# Patient Record
Sex: MALE | Race: WHITE | NOT HISPANIC OR LATINO | Employment: UNEMPLOYED | URBAN - METROPOLITAN AREA
[De-identification: names, ages, dates, MRNs, and addresses within clinical notes are randomized per-mention and may not be internally consistent; named-entity substitution may affect disease eponyms.]

---

## 2022-09-23 PROCEDURE — 96374 THER/PROPH/DIAG INJ IV PUSH: CPT

## 2022-09-23 PROCEDURE — 99285 EMERGENCY DEPT VISIT HI MDM: CPT | Mod: 25

## 2022-09-24 ENCOUNTER — HOSPITAL ENCOUNTER (EMERGENCY)
Facility: HOSPITAL | Age: 52
Discharge: HOME | End: 2022-09-24
Attending: STUDENT IN AN ORGANIZED HEALTH CARE EDUCATION/TRAINING PROGRAM
Payer: MEDICAID

## 2022-09-24 VITALS
WEIGHT: 228.63 LBS | DIASTOLIC BLOOD PRESSURE: 81 MMHG | SYSTOLIC BLOOD PRESSURE: 139 MMHG | TEMPERATURE: 98 F | RESPIRATION RATE: 18 BRPM | HEART RATE: 69 BPM | BODY MASS INDEX: 32.01 KG/M2 | HEIGHT: 71 IN | OXYGEN SATURATION: 99 %

## 2022-09-24 DIAGNOSIS — K42.9 UMBILICAL HERNIA WITHOUT OBSTRUCTION AND WITHOUT GANGRENE: Primary | ICD-10-CM

## 2022-09-24 LAB
ALBUMIN SERPL-MCNC: 4.2 GM/DL (ref 3.5–5)
ALBUMIN/GLOB SERPL: 1.3 RATIO (ref 1.1–2)
ALP SERPL-CCNC: 72 UNIT/L (ref 40–150)
ALT SERPL-CCNC: 28 UNIT/L (ref 0–55)
AST SERPL-CCNC: 33 UNIT/L (ref 5–34)
BASOPHILS # BLD AUTO: 0.05 X10(3)/MCL (ref 0–0.2)
BASOPHILS NFR BLD AUTO: 0.5 %
BILIRUBIN DIRECT+TOT PNL SERPL-MCNC: 0.7 MG/DL
BUN SERPL-MCNC: 16.9 MG/DL (ref 8.4–25.7)
CALCIUM SERPL-MCNC: 9.7 MG/DL (ref 8.4–10.2)
CHLORIDE SERPL-SCNC: 101 MMOL/L (ref 98–107)
CO2 SERPL-SCNC: 29 MMOL/L (ref 22–29)
CREAT SERPL-MCNC: 1.02 MG/DL (ref 0.73–1.18)
CRP SERPL-MCNC: 1.5 MG/L
EOSINOPHIL # BLD AUTO: 0.09 X10(3)/MCL (ref 0–0.9)
EOSINOPHIL NFR BLD AUTO: 0.9 %
ERYTHROCYTE [DISTWIDTH] IN BLOOD BY AUTOMATED COUNT: 12.1 % (ref 11.5–17)
GFR SERPLBLD CREATININE-BSD FMLA CKD-EPI: >60 MLS/MIN/1.73/M2
GLOBULIN SER-MCNC: 3.2 GM/DL (ref 2.4–3.5)
GLUCOSE SERPL-MCNC: 127 MG/DL (ref 74–100)
HCT VFR BLD AUTO: 42.4 % (ref 42–52)
HGB BLD-MCNC: 14.6 GM/DL (ref 14–18)
IMM GRANULOCYTES # BLD AUTO: 0.02 X10(3)/MCL (ref 0–0.04)
IMM GRANULOCYTES NFR BLD AUTO: 0.2 %
LYMPHOCYTES # BLD AUTO: 3.33 X10(3)/MCL (ref 0.6–4.6)
LYMPHOCYTES NFR BLD AUTO: 32.9 %
MCH RBC QN AUTO: 30.9 PG (ref 27–31)
MCHC RBC AUTO-ENTMCNC: 34.4 MG/DL (ref 33–36)
MCV RBC AUTO: 89.6 FL (ref 80–94)
MONOCYTES # BLD AUTO: 0.93 X10(3)/MCL (ref 0.1–1.3)
MONOCYTES NFR BLD AUTO: 9.2 %
NEUTROPHILS # BLD AUTO: 5.7 X10(3)/MCL (ref 2.1–9.2)
NEUTROPHILS NFR BLD AUTO: 56.3 %
NRBC BLD AUTO-RTO: 0 %
PLATELET # BLD AUTO: 236 X10(3)/MCL (ref 130–400)
PMV BLD AUTO: 10.5 FL (ref 7.4–10.4)
POTASSIUM SERPL-SCNC: 3.6 MMOL/L (ref 3.5–5.1)
PROT SERPL-MCNC: 7.4 GM/DL (ref 6.4–8.3)
RBC # BLD AUTO: 4.73 X10(6)/MCL (ref 4.7–6.1)
SODIUM SERPL-SCNC: 140 MMOL/L (ref 136–145)
WBC # SPEC AUTO: 10.1 X10(3)/MCL (ref 4.5–11.5)

## 2022-09-24 PROCEDURE — 36415 COLL VENOUS BLD VENIPUNCTURE: CPT | Performed by: PHYSICIAN ASSISTANT

## 2022-09-24 PROCEDURE — 86140 C-REACTIVE PROTEIN: CPT | Performed by: PHYSICIAN ASSISTANT

## 2022-09-24 PROCEDURE — 80053 COMPREHEN METABOLIC PANEL: CPT | Performed by: PHYSICIAN ASSISTANT

## 2022-09-24 PROCEDURE — 85025 COMPLETE CBC W/AUTO DIFF WBC: CPT | Performed by: PHYSICIAN ASSISTANT

## 2022-09-24 PROCEDURE — 63600175 PHARM REV CODE 636 W HCPCS: Performed by: PHYSICIAN ASSISTANT

## 2022-09-24 PROCEDURE — 25500020 PHARM REV CODE 255

## 2022-09-24 RX ORDER — MORPHINE SULFATE 2 MG/ML
4 INJECTION, SOLUTION INTRAMUSCULAR; INTRAVENOUS ONCE
Status: COMPLETED | OUTPATIENT
Start: 2022-09-24 | End: 2022-09-24

## 2022-09-24 RX ADMIN — IOPAMIDOL 100 ML: 755 INJECTION, SOLUTION INTRAVENOUS at 01:09

## 2022-09-24 RX ADMIN — MORPHINE SULFATE 4 MG: 2 INJECTION, SOLUTION INTRAMUSCULAR; INTRAVENOUS at 01:09

## 2022-09-24 NOTE — DISCHARGE INSTRUCTIONS
Surgery referral sent.  They will call you to schedule an appointment.  Return to the ED if hernia becomes hard an you are unable to reduce it.  Follow-up with the primary care provider within a week.

## 2022-09-24 NOTE — ED PROVIDER NOTES
Encounter Date: 9/23/2022       History     Chief Complaint   Patient presents with    Hernia     Pt reports feeling a bulge pop out above his belly button after getting off his riding  today, 10/10 pain,burning. Vss.      Patient is a 52-year-old male who reports to the ED with central abdominal pain that began today.  He reports feeling a bulge pop out above his belly button after getting off his riding lawnmower earlier today.  He rates pain 10/10.  Patient states that shortly after popped out, he lied down and put ice on it and is able to push bulge back in.  He did endorse a decreased pain once it was back in however it popped out again.  He denies fever, chills, nausea, diarrhea, constipation.    The history is provided by the patient. No  was used.   Abdominal Pain  The current episode started today. The onset of the illness was abrupt. The abdominal pain is located in the periumbilical region. The abdominal pain radiates to the periumbilical region. The severity of the abdominal pain is 10/10. The other symptoms of the illness do not include fever, shortness of breath, nausea or vomiting.   Symptoms associated with the illness do not include chills, constipation, hematuria or back pain.   Review of patient's allergies indicates:  No Known Allergies  History reviewed. No pertinent past medical history.  History reviewed. No pertinent surgical history.  History reviewed. No pertinent family history.  Social History     Tobacco Use    Smoking status: Never    Smokeless tobacco: Never   Substance Use Topics    Drug use: Never     Review of Systems   Constitutional:  Negative for chills and fever.   Respiratory:  Negative for cough and shortness of breath.    Cardiovascular:  Negative for chest pain and palpitations.   Gastrointestinal:  Positive for abdominal pain (Above navel). Negative for constipation, nausea and vomiting.   Genitourinary:  Negative for flank pain and hematuria.    Musculoskeletal:  Negative for back pain.   Skin: Negative.    Neurological: Negative.    Hematological: Negative.      Physical Exam     Initial Vitals [09/23/22 2243]   BP Pulse Resp Temp SpO2   (!) 153/73 79 19 97 °F (36.1 °C) 98 %      MAP       --         Physical Exam    Nursing note and vitals reviewed.  Constitutional: He appears well-developed and well-nourished.   HENT:   Nose: Nose normal.   Mouth/Throat: Oropharynx is clear and moist.   Eyes: Conjunctivae are normal.   Cardiovascular:  Normal rate, normal heart sounds and intact distal pulses.           Pulmonary/Chest: Breath sounds normal.   Abdominal: Abdomen is soft. Bowel sounds are normal.   Musculoskeletal:         General: Normal range of motion.     Neurological: He is alert and oriented to person, place, and time.   Skin: Skin is warm.       ED Course   Procedures  Labs Reviewed   COMPREHENSIVE METABOLIC PANEL - Abnormal; Notable for the following components:       Result Value    Glucose Level 127 (*)     All other components within normal limits   CBC WITH DIFFERENTIAL - Abnormal; Notable for the following components:    MPV 10.5 (*)     All other components within normal limits   C-REACTIVE PROTEIN - Normal   CBC W/ AUTO DIFFERENTIAL    Narrative:     The following orders were created for panel order CBC Auto Differential.  Procedure                               Abnormality         Status                     ---------                               -----------         ------                     CBC with Differential[[id number]]        Abnormal            Final result                 Please view results for these tests on the individual orders.          Imaging Results              CT Abdomen Pelvis With Contrast (In process)                      Medications   morphine injection 4 mg (4 mg Intravenous Given 9/24/22 0127)   iopamidoL (ISOVUE-370) 76 % injection (100 mLs Intravenous Given 9/24/22 0145)     Medical Decision Making:   Clinical  Tests:   Lab Tests: Ordered and Reviewed  Radiological Study: Ordered and Reviewed  ED Management:  The patient is resting comfortably and in no acute distress.  He states that his symptoms have improved after treatment in Emergency Department. I personally discussed his test results and treatment plan.  Gave strict ED precautions and specific conditions for return to the emergency department and importance of follow up with pcp.  Patient voices understanding and agrees to the plan discussed. All patients' questions have been answered at this time. He has remained hemodynamically stable throughout entire stay in ED and is stable for discharge home.            ED Course as of 09/24/22 0221   Sat Sep 24, 2022   0158 Patient states the morphine helped with his pain [ER]   0159 I transitioned the patient to Dr. Bains at this time.   [ER]      ED Course User Index  [ER] BECCA Garcia                 Clinical Impression:   Final diagnoses:  [K42.9] Umbilical hernia without obstruction and without gangrene (Primary)             BECCA Garcia  09/24/22 0221

## 2022-10-11 ENCOUNTER — OFFICE VISIT (OUTPATIENT)
Dept: SURGERY | Facility: CLINIC | Age: 52
End: 2022-10-11
Payer: MEDICAID

## 2022-10-11 VITALS
HEIGHT: 71 IN | TEMPERATURE: 98 F | DIASTOLIC BLOOD PRESSURE: 87 MMHG | BODY MASS INDEX: 32.59 KG/M2 | SYSTOLIC BLOOD PRESSURE: 138 MMHG | HEART RATE: 71 BPM | RESPIRATION RATE: 18 BRPM | WEIGHT: 232.81 LBS | OXYGEN SATURATION: 98 %

## 2022-10-11 DIAGNOSIS — K42.9 UMBILICAL HERNIA WITHOUT OBSTRUCTION AND WITHOUT GANGRENE: ICD-10-CM

## 2022-10-11 PROCEDURE — 99215 OFFICE O/P EST HI 40 MIN: CPT | Mod: PBBFAC

## 2022-10-11 RX ORDER — HEPARIN SODIUM 5000 [USP'U]/ML
5000 INJECTION, SOLUTION INTRAVENOUS; SUBCUTANEOUS ONCE
Status: CANCELLED | OUTPATIENT
Start: 2022-10-11

## 2022-10-11 RX ORDER — AMLODIPINE AND BENAZEPRIL HYDROCHLORIDE 5; 10 MG/1; MG/1
1 CAPSULE ORAL DAILY
COMMUNITY
Start: 2022-09-28 | End: 2023-04-21 | Stop reason: SDUPTHER

## 2022-10-11 RX ORDER — DESVENLAFAXINE SUCCINATE 50 MG/1
50 TABLET, EXTENDED RELEASE ORAL DAILY
COMMUNITY
Start: 2022-09-30 | End: 2023-10-27 | Stop reason: SDUPTHER

## 2022-10-11 RX ORDER — TAMSULOSIN HYDROCHLORIDE 0.4 MG/1
1 CAPSULE ORAL NIGHTLY
COMMUNITY
Start: 2022-09-28 | End: 2023-04-21

## 2022-10-11 RX ORDER — SODIUM CHLORIDE 9 MG/ML
INJECTION, SOLUTION INTRAVENOUS CONTINUOUS
Status: CANCELLED | OUTPATIENT
Start: 2022-10-11

## 2022-10-11 NOTE — H&P (VIEW-ONLY)
"Surgery Clinic Note     CC: Reducible umbilical hernia that appeared on 9/29/2022    HPI:  Humberto Cotto Jr., 52 y.o. -year-old male with PMHx of HTN, presents to clinic for evaluation of reducible umbilical hernia that appeared on 9/29/2022. Patient reports a dull constant pain superior of umbilicus that is a 6/10 when hernia is reduced and 10/10 when herniated. Patient reports that herniation occurs when he is working in the garage along with lifting heavy objects. Hernia is reducible with ice packs. Patient reports normal BM and urination. Patient does not smoke and has BMI of 32.47 kg/m^2. Denies f/c/n/v/CP/SOB.    PMH:   Past Medical History:   Diagnosis Date    Hypertension         PSH:   Past Surgical History:   Procedure Laterality Date    TONSILLECTOMY          Fam Hx:   Family History   Problem Relation Age of Onset    Hypertension Mother         Social Hx:   Social History     Tobacco Use    Smoking status: Never    Smokeless tobacco: Never   Substance Use Topics    Alcohol use: Not Currently     Comment: ocassionally    Drug use: Never        Allergies:   Review of patient's allergies indicates:   Allergen Reactions    Penicillin g sodium Hives         ROS: Negative except above     Current Outpatient Medications on File Prior to Visit   Medication Sig Dispense Refill    amlodipine-benazepril 5-10 mg (LOTREL) 5-10 mg per capsule Take 1 capsule by mouth once daily.      desvenlafaxine succinate (PRISTIQ) 50 MG Tb24 Take 50 mg by mouth once daily.      tamsulosin (FLOMAX) 0.4 mg Cap Take 1 capsule by mouth every evening.       No current facility-administered medications on file prior to visit.       Physical Exam  /87 (BP Location: Right arm, Patient Position: Sitting)   Pulse 71   Temp 97.7 °F (36.5 °C) (Oral)   Resp 18   Ht 5' 11" (1.803 m)   Wt 105.6 kg (232 lb 12.8 oz)   SpO2 98%   BMI 32.47 kg/m²   General: NAD, AAO X 3  CV: Regular rate and rhythm without murmurs, normal S1 and S2 " sounds  Resp: Clear to ascultation bilaterally  Abdomen: soft, non-distended, bowel sounds present. Small umbilical hernia, reducible. Mild TTP with reduction of hernia contents    Imaging:  EXAMINATION:  CT ABDOMEN PELVIS WITH CONTRAST     Technique: CT of the abdomen and pelvis was performed with axial images as well as sagittal and coronal reconstruction images with intravenous contrast.     Comparison: None available.     Clinical History: Central abdominal pain, possible hernia.     Dosage Information: Automated Exposure Control was utilized 708.57 mGy.cm.     Findings:     Lines and Tubes: None.     Thorax:     Lungs: The visualized lung bases appear unremarkable.     Pleura: No effusions or thickening.     Heart: The heart size is within normal limits.     Abdomen:     Abdominal Wall: There is a small umbilical hernia which contains mesenteric fat with subtle associated mesenteric fat stranding which could reflect an element of vascular compromise to the containing fat.     Liver: The liver appears unremarkable.     Biliary System: No intrahepatic or extrahepatic biliary duct dilatation is seen.     Gallbladder: The gallbladder appears unremarkable with no stones wall thickening or pericholecystic inflammatory changes or fluid.     Pancreas: The pancreas appears unremarkable.     Spleen: The spleen appears unremarkable.     Adrenals: The adrenal glands appear unremarkable.     Kidneys: The kidneys appear unremarkable with no stones cysts masses or hydronephrosis.     Aorta: The abdominal aorta appears unremarkable.     IVC: Unremarkable.     Bowel:     Esophagus: The visualized esophagus appears unremarkable.     Stomach: The stomach appears unremarkable.     Duodenum: Unremarkable appearing duodenum.     Small Bowel: The small bowel appears unremarkable.     Colon: Nondistended.     Appendix: The appendix appears unremarkable and is seen on Image 57, Series 6.     Peritoneum: No intraperitoneal free air or  ascites is seen.     Pelvis:     Bladder: The bladder appears unremarkable.     Male:     Prostate gland: The prostate gland appears unremarkable.     Inguinal Findings:     Inguinal Hernia: Incidental note is made of small uncomplicated mesenteric fat containing bilateral inguinal hernias.     Bony structures:     Dorsal Spine: There is mild spondylosis of the visualized dorsal spine.     Bony Pelvis: The visualized bony structures of the pelvis appear unremarkable.     Impression:  Impression:     1. There is a small umbilical hernia which contains mesenteric fat with subtle associated mesenteric fat stranding which could reflect an element of vascular compromise to the containing fat. Correlate with clinical and laboratory findings as regard to additional evaluation and follow up.     I concur with the preliminary report        Electronically signed by: Misha Altman  Date:                                            09/24/2022  Time:                                           08:11           Exam Ended: 09/24/22 02:04 Last Resulted: 09/24/22 08:11                     ASSESSMENT/PLAN  Humberto Kirti Salazar, 52 y.o. -year-old male with PMHx of HTN, presents to clinic for evaluation of reducible umbilical hernia that appeared on 9/29/2022.    -Scheduled for surgery on 10/28/2022 with Dr. Reyes  -consent form filled out with patient education concerning risks and benefits of surgery      Flako Paredes  [employer] MS[facility]    Agree with student note    - Plan for open umbilical hernia repair 10/28/22  - Consents signed in clinic    aMla Spears MD  [employer] Surgery PGY3

## 2022-10-11 NOTE — PROGRESS NOTES
Patient seen by Dr. Spears. Surgery consent signed and completed. Surgery date of 10/28/22 with RTC 2 weeks postop. Instructions and buck wash cloths given to patient. Instructed patient that [facility] will call for preop appointment. Discharge instructions given verbal and written.

## 2022-10-11 NOTE — PROGRESS NOTES
"Surgery Clinic Note     CC: Reducible umbilical hernia that appeared on 9/29/2022    HPI:  Humberto Cotto Jr., 52 y.o. -year-old male with PMHx of HTN, presents to clinic for evaluation of reducible umbilical hernia that appeared on 9/29/2022. Patient reports a dull constant pain superior of umbilicus that is a 6/10 when hernia is reduced and 10/10 when herniated. Patient reports that herniation occurs when he is working in the garage along with lifting heavy objects. Hernia is reducible with ice packs. Patient reports normal BM and urination. Patient does not smoke and has BMI of 32.47 kg/m^2. Denies f/c/n/v/CP/SOB.    PMH:   Past Medical History:   Diagnosis Date    Hypertension         PSH:   Past Surgical History:   Procedure Laterality Date    TONSILLECTOMY          Fam Hx:   Family History   Problem Relation Age of Onset    Hypertension Mother         Social Hx:   Social History     Tobacco Use    Smoking status: Never    Smokeless tobacco: Never   Substance Use Topics    Alcohol use: Not Currently     Comment: ocassionally    Drug use: Never        Allergies:   Review of patient's allergies indicates:   Allergen Reactions    Penicillin g sodium Hives         ROS: Negative except above     Current Outpatient Medications on File Prior to Visit   Medication Sig Dispense Refill    amlodipine-benazepril 5-10 mg (LOTREL) 5-10 mg per capsule Take 1 capsule by mouth once daily.      desvenlafaxine succinate (PRISTIQ) 50 MG Tb24 Take 50 mg by mouth once daily.      tamsulosin (FLOMAX) 0.4 mg Cap Take 1 capsule by mouth every evening.       No current facility-administered medications on file prior to visit.       Physical Exam  /87 (BP Location: Right arm, Patient Position: Sitting)   Pulse 71   Temp 97.7 °F (36.5 °C) (Oral)   Resp 18   Ht 5' 11" (1.803 m)   Wt 105.6 kg (232 lb 12.8 oz)   SpO2 98%   BMI 32.47 kg/m²   General: NAD, AAO X 3  CV: Regular rate and rhythm without murmurs, normal S1 and S2 " sounds  Resp: Clear to ascultation bilaterally  Abdomen: soft, non-distended, bowel sounds present. Small umbilical hernia, reducible. Mild TTP with reduction of hernia contents    Imaging:  EXAMINATION:  CT ABDOMEN PELVIS WITH CONTRAST     Technique: CT of the abdomen and pelvis was performed with axial images as well as sagittal and coronal reconstruction images with intravenous contrast.     Comparison: None available.     Clinical History: Central abdominal pain, possible hernia.     Dosage Information: Automated Exposure Control was utilized 708.57 mGy.cm.     Findings:     Lines and Tubes: None.     Thorax:     Lungs: The visualized lung bases appear unremarkable.     Pleura: No effusions or thickening.     Heart: The heart size is within normal limits.     Abdomen:     Abdominal Wall: There is a small umbilical hernia which contains mesenteric fat with subtle associated mesenteric fat stranding which could reflect an element of vascular compromise to the containing fat.     Liver: The liver appears unremarkable.     Biliary System: No intrahepatic or extrahepatic biliary duct dilatation is seen.     Gallbladder: The gallbladder appears unremarkable with no stones wall thickening or pericholecystic inflammatory changes or fluid.     Pancreas: The pancreas appears unremarkable.     Spleen: The spleen appears unremarkable.     Adrenals: The adrenal glands appear unremarkable.     Kidneys: The kidneys appear unremarkable with no stones cysts masses or hydronephrosis.     Aorta: The abdominal aorta appears unremarkable.     IVC: Unremarkable.     Bowel:     Esophagus: The visualized esophagus appears unremarkable.     Stomach: The stomach appears unremarkable.     Duodenum: Unremarkable appearing duodenum.     Small Bowel: The small bowel appears unremarkable.     Colon: Nondistended.     Appendix: The appendix appears unremarkable and is seen on Image 57, Series 6.     Peritoneum: No intraperitoneal free air or  ascites is seen.     Pelvis:     Bladder: The bladder appears unremarkable.     Male:     Prostate gland: The prostate gland appears unremarkable.     Inguinal Findings:     Inguinal Hernia: Incidental note is made of small uncomplicated mesenteric fat containing bilateral inguinal hernias.     Bony structures:     Dorsal Spine: There is mild spondylosis of the visualized dorsal spine.     Bony Pelvis: The visualized bony structures of the pelvis appear unremarkable.     Impression:  Impression:     1. There is a small umbilical hernia which contains mesenteric fat with subtle associated mesenteric fat stranding which could reflect an element of vascular compromise to the containing fat. Correlate with clinical and laboratory findings as regard to additional evaluation and follow up.     I concur with the preliminary report        Electronically signed by: Misha Altman  Date:                                            09/24/2022  Time:                                           08:11           Exam Ended: 09/24/22 02:04 Last Resulted: 09/24/22 08:11                     ASSESSMENT/PLAN  Humberto Kirti Salazar, 52 y.o. -year-old male with PMHx of HTN, presents to clinic for evaluation of reducible umbilical hernia that appeared on 9/29/2022.    -Scheduled for surgery on 10/28/2022 with Dr. Reyes  -consent form filled out with patient education concerning risks and benefits of surgery      Flako Paredes  [employer] [facility]    Agree with student note    - Plan for open umbilical hernia repair 10/28/22  - Consents signed in clinic    Mala Spears MD  [employer] Surgery PGY3

## 2022-10-18 ENCOUNTER — ANESTHESIA EVENT (OUTPATIENT)
Dept: SURGERY | Facility: HOSPITAL | Age: 52
End: 2022-10-18
Payer: MEDICAID

## 2022-10-18 NOTE — ANESTHESIA PREPROCEDURE EVALUATION
10/18/2022  Humberto Cotto Jr. is a 52 y.o., male.    COVID STATUS: TEST DOS  BETA-BLOCKER: NONE    PAT NURSE & NP PHONE INTERVIEW 10/20/22    PROBLEM LIST:  -  UMBILICAL HERNIA  -  OBESITY CLASS I  -  HTN  -  REPORTS on PRISTIQ for SWEATY, ODOROUS FEET; DENIES DEPRESSION   -  URINARY RETENTION, NEVER Dx w/BPH  -  ETOH 1 BEER 2x/WEEK    AM Rx DOS: PRISTIQ, FLOMAX    ORDERS -   SURGEON: 9/24/22 CBC, CMP; NO NEW ORDERS  ANESTHESIA: NONE    Pre-op Assessment    I have reviewed the Patient Summary Reports.     I have reviewed the Nursing Notes. I have reviewed the NPO Status.   I have reviewed the Medications.     Review of Systems  Anesthesia Hx:  No problems with previous Anesthesia  History of prior surgery of interest to airway management or planning: Denies Family Hx of Anesthesia complications.   Denies Personal Hx of Anesthesia complications.   Hematology/Oncology:  Hematology Normal   Oncology Normal     EENT/Dental:EENT/Dental Normal   Cardiovascular:   Exercise tolerance: good Hypertension    Pulmonary:  Pulmonary Normal    Renal/:  Renal/ Normal     Hepatic/GI:  Hepatic/GI Normal    Musculoskeletal:  Musculoskeletal Normal    Neurological:  Neurology Normal    Endocrine:  Endocrine Normal    Dermatological:  Skin Normal    Psych:  Psychiatric Normal           Physical Exam  General: Well nourished, Cooperative, Alert and Oriented    Airway:  Mallampati: I / I  Mouth Opening: Normal  TM Distance: Normal  Tongue: Large, Normal  Neck ROM: Normal ROM    Dental:  Intact        Anesthesia Plan  Type of Anesthesia, risks & benefits discussed:    Anesthesia Type: Gen ETT  Intra-op Monitoring Plan: Standard ASA Monitors  Post Op Pain Control Plan: multimodal analgesia and IV/PO Opioids PRN  Induction:  IV  Airway Plan: Direct  Informed Consent: Informed consent signed with the Patient and all parties  understand the risks and agree with anesthesia plan.  All questions answered. Patient consented to blood products? No  ASA Score: 2  Day of Surgery Review of History & Physical: H&P Update referred to the surgeon/provider.    Ready For Surgery From Anesthesia Perspective.     .    Lab Results   Component Value Date    WBC 10.1 09/24/2022    HGB 14.6 09/24/2022    HCT 42.4 09/24/2022    MCV 89.6 09/24/2022     09/24/2022     CMP  Sodium Level   Date Value Ref Range Status   09/24/2022 140 136 - 145 mmol/L Final     Potassium Level   Date Value Ref Range Status   09/24/2022 3.6 3.5 - 5.1 mmol/L Final     Carbon Dioxide   Date Value Ref Range Status   09/24/2022 29 22 - 29 mmol/L Final     Blood Urea Nitrogen   Date Value Ref Range Status   09/24/2022 16.9 8.4 - 25.7 mg/dL Final     Creatinine   Date Value Ref Range Status   09/24/2022 1.02 0.73 - 1.18 mg/dL Final     Calcium Level Total   Date Value Ref Range Status   09/24/2022 9.7 8.4 - 10.2 mg/dL Final     Albumin Level   Date Value Ref Range Status   09/24/2022 4.2 3.5 - 5.0 gm/dL Final     Bilirubin Total   Date Value Ref Range Status   09/24/2022 0.7 <=1.5 mg/dL Final     Alkaline Phosphatase   Date Value Ref Range Status   09/24/2022 72 40 - 150 unit/L Final     Aspartate Aminotransferase   Date Value Ref Range Status   09/24/2022 33 5 - 34 unit/L Final     Alanine Aminotransferase   Date Value Ref Range Status   09/24/2022 28 0 - 55 unit/L Final

## 2022-10-28 ENCOUNTER — ANESTHESIA (OUTPATIENT)
Dept: SURGERY | Facility: HOSPITAL | Age: 52
End: 2022-10-28
Payer: MEDICAID

## 2022-10-28 ENCOUNTER — HOSPITAL ENCOUNTER (OUTPATIENT)
Facility: HOSPITAL | Age: 52
Discharge: HOME | End: 2022-10-28
Attending: SURGERY | Admitting: SURGERY
Payer: MEDICAID

## 2022-10-28 DIAGNOSIS — K42.9 UMBILICAL HERNIA WITHOUT OBSTRUCTION AND WITHOUT GANGRENE: Primary | ICD-10-CM

## 2022-10-28 PROCEDURE — 71000016 HC POSTOP RECOV ADDL HR: Performed by: SURGERY

## 2022-10-28 PROCEDURE — 25000003 PHARM REV CODE 250: Performed by: STUDENT IN AN ORGANIZED HEALTH CARE EDUCATION/TRAINING PROGRAM

## 2022-10-28 PROCEDURE — 37000008 HC ANESTHESIA 1ST 15 MINUTES: Performed by: SURGERY

## 2022-10-28 PROCEDURE — 00830 ANES HERNIA RPR LWR ABD NOS: CPT | Performed by: SURGERY

## 2022-10-28 PROCEDURE — 36000706: Performed by: SURGERY

## 2022-10-28 PROCEDURE — 63600175 PHARM REV CODE 636 W HCPCS: Performed by: NURSE ANESTHETIST, CERTIFIED REGISTERED

## 2022-10-28 PROCEDURE — 63600175 PHARM REV CODE 636 W HCPCS: Performed by: SPECIALIST

## 2022-10-28 PROCEDURE — 63600175 PHARM REV CODE 636 W HCPCS: Performed by: STUDENT IN AN ORGANIZED HEALTH CARE EDUCATION/TRAINING PROGRAM

## 2022-10-28 PROCEDURE — 37000009 HC ANESTHESIA EA ADD 15 MINS: Performed by: SURGERY

## 2022-10-28 PROCEDURE — 36000707: Performed by: SURGERY

## 2022-10-28 PROCEDURE — 71000015 HC POSTOP RECOV 1ST HR: Performed by: SURGERY

## 2022-10-28 PROCEDURE — 71000033 HC RECOVERY, INTIAL HOUR: Performed by: SURGERY

## 2022-10-28 PROCEDURE — 88302 TISSUE EXAM BY PATHOLOGIST: CPT | Performed by: SURGERY

## 2022-10-28 PROCEDURE — 25000003 PHARM REV CODE 250: Performed by: NURSE ANESTHETIST, CERTIFIED REGISTERED

## 2022-10-28 PROCEDURE — 25000003 PHARM REV CODE 250: Performed by: SURGERY

## 2022-10-28 RX ORDER — ONDANSETRON 2 MG/ML
4 INJECTION INTRAMUSCULAR; INTRAVENOUS ONCE
Status: DISCONTINUED | OUTPATIENT
Start: 2022-10-28 | End: 2022-10-28 | Stop reason: HOSPADM

## 2022-10-28 RX ORDER — MIDAZOLAM HYDROCHLORIDE 1 MG/ML
INJECTION INTRAMUSCULAR; INTRAVENOUS
Status: DISCONTINUED
Start: 2022-10-28 | End: 2022-10-28 | Stop reason: HOSPADM

## 2022-10-28 RX ORDER — BUPIVACAINE HYDROCHLORIDE 2.5 MG/ML
INJECTION, SOLUTION EPIDURAL; INFILTRATION; INTRACAUDAL
Status: DISCONTINUED
Start: 2022-10-28 | End: 2022-10-28 | Stop reason: HOSPADM

## 2022-10-28 RX ORDER — VASOPRESSIN 20 [USP'U]/ML
INJECTION, SOLUTION INTRAMUSCULAR; SUBCUTANEOUS
Status: DISCONTINUED | OUTPATIENT
Start: 2022-10-28 | End: 2022-10-28

## 2022-10-28 RX ORDER — DIPHENHYDRAMINE HYDROCHLORIDE 50 MG/ML
25 INJECTION INTRAMUSCULAR; INTRAVENOUS ONCE AS NEEDED
Status: DISCONTINUED | OUTPATIENT
Start: 2022-10-28 | End: 2022-10-28 | Stop reason: HOSPADM

## 2022-10-28 RX ORDER — FENTANYL CITRATE 50 UG/ML
INJECTION, SOLUTION INTRAMUSCULAR; INTRAVENOUS
Status: DISCONTINUED | OUTPATIENT
Start: 2022-10-28 | End: 2022-10-28

## 2022-10-28 RX ORDER — LIDOCAINE HYDROCHLORIDE 20 MG/ML
INJECTION, SOLUTION EPIDURAL; INFILTRATION; INTRACAUDAL; PERINEURAL
Status: DISCONTINUED | OUTPATIENT
Start: 2022-10-28 | End: 2022-10-28

## 2022-10-28 RX ORDER — CLINDAMYCIN PHOSPHATE 900 MG/50ML
900 INJECTION, SOLUTION INTRAVENOUS
Status: COMPLETED | OUTPATIENT
Start: 2022-10-28 | End: 2022-10-28

## 2022-10-28 RX ORDER — OXYCODONE AND ACETAMINOPHEN 5; 325 MG/1; MG/1
2 TABLET ORAL ONCE
Status: DISCONTINUED | OUTPATIENT
Start: 2022-10-28 | End: 2022-10-28 | Stop reason: HOSPADM

## 2022-10-28 RX ORDER — EPHEDRINE SULFATE 50 MG/ML
INJECTION, SOLUTION INTRAVENOUS
Status: DISCONTINUED | OUTPATIENT
Start: 2022-10-28 | End: 2022-10-28

## 2022-10-28 RX ORDER — PROCHLORPERAZINE EDISYLATE 5 MG/ML
5 INJECTION INTRAMUSCULAR; INTRAVENOUS ONCE AS NEEDED
Status: DISCONTINUED | OUTPATIENT
Start: 2022-10-28 | End: 2022-10-28 | Stop reason: HOSPADM

## 2022-10-28 RX ORDER — KETOROLAC TROMETHAMINE 30 MG/ML
INJECTION, SOLUTION INTRAMUSCULAR; INTRAVENOUS
Status: DISCONTINUED | OUTPATIENT
Start: 2022-10-28 | End: 2022-10-28

## 2022-10-28 RX ORDER — HYDROCODONE BITARTRATE AND ACETAMINOPHEN 5; 325 MG/1; MG/1
1 TABLET ORAL EVERY 6 HOURS PRN
Qty: 10 TABLET | Refills: 0 | Status: SHIPPED | OUTPATIENT
Start: 2022-10-28 | End: 2023-04-21

## 2022-10-28 RX ORDER — MEPERIDINE HYDROCHLORIDE 25 MG/ML
12.5 INJECTION INTRAMUSCULAR; INTRAVENOUS; SUBCUTANEOUS ONCE
Status: DISCONTINUED | OUTPATIENT
Start: 2022-10-28 | End: 2022-10-28 | Stop reason: HOSPADM

## 2022-10-28 RX ORDER — LIDOCAINE HYDROCHLORIDE 10 MG/ML
1 INJECTION, SOLUTION EPIDURAL; INFILTRATION; INTRACAUDAL; PERINEURAL ONCE
Status: DISCONTINUED | OUTPATIENT
Start: 2022-10-28 | End: 2023-04-21

## 2022-10-28 RX ORDER — DEXAMETHASONE SODIUM PHOSPHATE 4 MG/ML
INJECTION, SOLUTION INTRA-ARTICULAR; INTRALESIONAL; INTRAMUSCULAR; INTRAVENOUS; SOFT TISSUE
Status: DISCONTINUED | OUTPATIENT
Start: 2022-10-28 | End: 2022-10-28

## 2022-10-28 RX ORDER — HYDROMORPHONE HYDROCHLORIDE 1 MG/ML
0.5 INJECTION, SOLUTION INTRAMUSCULAR; INTRAVENOUS; SUBCUTANEOUS EVERY 5 MIN PRN
Status: DISCONTINUED | OUTPATIENT
Start: 2022-10-28 | End: 2022-10-28 | Stop reason: HOSPADM

## 2022-10-28 RX ORDER — ONDANSETRON 2 MG/ML
INJECTION INTRAMUSCULAR; INTRAVENOUS
Status: DISCONTINUED | OUTPATIENT
Start: 2022-10-28 | End: 2022-10-28

## 2022-10-28 RX ORDER — IPRATROPIUM BROMIDE AND ALBUTEROL SULFATE 2.5; .5 MG/3ML; MG/3ML
3 SOLUTION RESPIRATORY (INHALATION) ONCE AS NEEDED
Status: DISCONTINUED | OUTPATIENT
Start: 2022-10-28 | End: 2022-10-28 | Stop reason: HOSPADM

## 2022-10-28 RX ORDER — MIDAZOLAM HYDROCHLORIDE 1 MG/ML
2 INJECTION INTRAMUSCULAR; INTRAVENOUS ONCE AS NEEDED
Status: COMPLETED | OUTPATIENT
Start: 2022-10-28 | End: 2022-10-28

## 2022-10-28 RX ORDER — SODIUM CHLORIDE, SODIUM LACTATE, POTASSIUM CHLORIDE, CALCIUM CHLORIDE 600; 310; 30; 20 MG/100ML; MG/100ML; MG/100ML; MG/100ML
INJECTION, SOLUTION INTRAVENOUS CONTINUOUS
Status: DISCONTINUED | OUTPATIENT
Start: 2022-10-28 | End: 2022-10-28 | Stop reason: HOSPADM

## 2022-10-28 RX ORDER — HYDROMORPHONE HYDROCHLORIDE 1 MG/ML
0.2 INJECTION, SOLUTION INTRAMUSCULAR; INTRAVENOUS; SUBCUTANEOUS EVERY 5 MIN PRN
Status: DISCONTINUED | OUTPATIENT
Start: 2022-10-28 | End: 2022-10-28 | Stop reason: HOSPADM

## 2022-10-28 RX ORDER — SODIUM CHLORIDE 9 MG/ML
INJECTION, SOLUTION INTRAVENOUS CONTINUOUS
Status: DISCONTINUED | OUTPATIENT
Start: 2022-10-28 | End: 2022-10-28 | Stop reason: HOSPADM

## 2022-10-28 RX ORDER — BUPIVACAINE HYDROCHLORIDE 2.5 MG/ML
INJECTION, SOLUTION EPIDURAL; INFILTRATION; INTRACAUDAL
Status: DISCONTINUED | OUTPATIENT
Start: 2022-10-28 | End: 2022-10-28 | Stop reason: HOSPADM

## 2022-10-28 RX ORDER — HEPARIN SODIUM 5000 [USP'U]/ML
5000 INJECTION, SOLUTION INTRAVENOUS; SUBCUTANEOUS ONCE
Status: COMPLETED | OUTPATIENT
Start: 2022-10-28 | End: 2022-10-28

## 2022-10-28 RX ORDER — PROPOFOL 10 MG/ML
VIAL (ML) INTRAVENOUS
Status: DISCONTINUED | OUTPATIENT
Start: 2022-10-28 | End: 2022-10-28

## 2022-10-28 RX ADMIN — MIDAZOLAM HYDROCHLORIDE 2 MG: 1 INJECTION, SOLUTION INTRAMUSCULAR; INTRAVENOUS at 09:10

## 2022-10-28 RX ADMIN — CLINDAMYCIN IN 5 PERCENT DEXTROSE 900 MG: 18 INJECTION, SOLUTION INTRAVENOUS at 09:10

## 2022-10-28 RX ADMIN — PROPOFOL 40 MG: 10 INJECTION, EMULSION INTRAVENOUS at 09:10

## 2022-10-28 RX ADMIN — EPHEDRINE SULFATE 15 MG: 50 INJECTION INTRAVENOUS at 09:10

## 2022-10-28 RX ADMIN — KETOROLAC TROMETHAMINE 30 MG: 30 INJECTION, SOLUTION INTRAMUSCULAR; INTRAVENOUS at 09:10

## 2022-10-28 RX ADMIN — FENTANYL CITRATE 50 MCG: 50 INJECTION, SOLUTION INTRAMUSCULAR; INTRAVENOUS at 09:10

## 2022-10-28 RX ADMIN — DEXAMETHASONE SODIUM PHOSPHATE 8 MG: 4 INJECTION, SOLUTION INTRA-ARTICULAR; INTRALESIONAL; INTRAMUSCULAR; INTRAVENOUS; SOFT TISSUE at 09:10

## 2022-10-28 RX ADMIN — VASOPRESSIN 1 UNITS: 20 INJECTION INTRAVENOUS at 10:10

## 2022-10-28 RX ADMIN — VASOPRESSIN 1 UNITS: 20 INJECTION INTRAVENOUS at 09:10

## 2022-10-28 RX ADMIN — EPHEDRINE SULFATE 10 MG: 50 INJECTION INTRAVENOUS at 09:10

## 2022-10-28 RX ADMIN — LIDOCAINE HYDROCHLORIDE 100 MG: 20 INJECTION, SOLUTION EPIDURAL; INFILTRATION; INTRACAUDAL; PERINEURAL at 09:10

## 2022-10-28 RX ADMIN — SODIUM CHLORIDE, POTASSIUM CHLORIDE, SODIUM LACTATE AND CALCIUM CHLORIDE: 600; 310; 30; 20 INJECTION, SOLUTION INTRAVENOUS at 09:10

## 2022-10-28 RX ADMIN — PROPOFOL 30 MG: 10 INJECTION, EMULSION INTRAVENOUS at 10:10

## 2022-10-28 RX ADMIN — PROPOFOL 200 MG: 10 INJECTION, EMULSION INTRAVENOUS at 09:10

## 2022-10-28 RX ADMIN — HEPARIN SODIUM 5000 UNITS: 5000 INJECTION, SOLUTION INTRAVENOUS; SUBCUTANEOUS at 07:10

## 2022-10-28 RX ADMIN — ONDANSETRON 4 MG: 2 INJECTION INTRAMUSCULAR; INTRAVENOUS at 10:10

## 2022-10-28 RX ADMIN — EPHEDRINE SULFATE 25 MG: 50 INJECTION INTRAVENOUS at 09:10

## 2022-10-28 NOTE — DISCHARGE INSTRUCTIONS
UMBILICAL HERNIA    ***** PLEASE KEEP THESE POST OP INSTRUCTIONS WITH YOU UNTIL YOUR FOLLOW-UP APPOINTMENT *****    · FOLLOW UP: Appointment in [facility] Nov 10th at 11:00am.    · PAIN: Apply ice pack to abdomen as needed for pain. Alternate Tylenol and Ibuprofen (regular over the counter- follow instructions on the bottle). Take Your prescription pain medication AS PRESCRIBED ONLY for severe pain unrelieved by Tylenol (acetaminophen) or Ibuprofen.    · EXAMPLE: Take Tylenol. Three hours later take Ibuprofen. Three hours after that take Tylenol again, and repeat until no longer needed. If Pain is still bad once you start Tylenol and Ibuprofen, add pain medication. Dont drive or drink alcohol with pain medication. Dont take pain medication more often than it is prescribed to be taken.    INFECTION: Call your doctor at [phone] or report to the emergency room:    - if redness around your incision begins to spread, or you have swelling.    - If your incision has opened up    - If you have green, yellow, or cottage cheese-like drainage coming from your incision    - If you begin to run fever over 100.4 F    - if you are feeling weaker    - INCISION (WOUND) CARE: Leave clear dressing and guaze in place for 24 hours then ok to remove. Leave adhesive glue on your skin until the glue peels away on its own. You may take a shower tomorrow. Wash gently over incision site - do not scrub or peel skin glue. Do not soak your wounds in water for 6 weeks. Do not take baths, swim, or use a hot tub until your doctor says it is okay.    · NAUSEA: You can eat and drink whatever you like as tolerated. You may experience post anesthesia nausea. Apply cold cloths to your face and neck and call your doctor for a prescription for nausea medication. If you have any uncontrolled vomiting that is not resolving within a few hours, report to your emergency room. Resume all medications tomorrow.    · ACTIVITY: Do not lift anything that  "is heavier than 10 lbs. (4.5 kg) for 6 week. Return to work when you feel well enough: your pain is controlled without the narcotic pain medication and you feel strong enough. Do not drink alcohol or drive today, or as long as you are on pain medication.    · Notify MD of any moderate to severe pain unrelieved by pain medicine or for any signs of infection including fever above 100.4, excessive redness or swelling, yellow/green foul- smelling drainage, nausea or vomiting. Clinics number is [phone]. If it is after business hours or emergency call [phone] and state Im having post op complications and need to speak to the surgeon on call.   If when doing activity and you feel a  "POP" go to the ER.    · We appreciate you checking in for us. Thanks for choosing [facility]! Have a smooth recovery!  "

## 2022-10-28 NOTE — ANESTHESIA PROCEDURE NOTES
Intubation    Date/Time: 10/28/2022 9:32 AM  Performed by: Yury Watt CRNA  Authorized by: Sydney Medina MD     Intubation:     Induction:  Intravenous    Intubated:  Postinduction    Mask Ventilation:  Easy with oral airway    Attempts:  1    Attempted By:  Student    Difficult Airway Encountered?: No      Complications:  None    Airway Device:  Supraglottic airway/LMA    Airway Device Size:  4.0    Placement Verified By:  Capnometry    Complicating Factors:  None    Findings Post-Intubation:  BS equal bilateral and atraumatic/condition of teeth unchanged

## 2022-10-28 NOTE — INTERVAL H&P NOTE
The patient has been examined and the H&P has been reviewed:    I concur with the findings and no changes have occurred since H&P was written.    Surgery risks, benefits and alternative options discussed and understood by patient/family.    To OR for open UHR without mesh      Hospital Problems:  - Umbilical hernia, reducible

## 2022-10-28 NOTE — OP NOTE
PATIENT NAME: Humberto Cotto Jr.  MRN: [id number]  ADMIT DATE: 10/28/2022  PROCEDURE DATE: 10/28/22    SURGEON: Dr. Joesph Reyes MD    RESIDENT: Mike Moreno MD    PREOPERATIVE DIAGNOSES:  Incarcerated Umbilical Hernia    POSTOPERATIVE DIAGNOSES:  Incarcerated Umbilical Hernia    PROCEDURE PERFORMED:  Umbilical Hernia Repair Primary Repair    INDICATION: This is a 53 yo M with a PMH of HTN who presents with an incarcerated umbilical hernia. CT confirmed this fascial defect to be 1cm in size and containing only fat. He presented for umbilical hernia repair.    FINDINGS: 1cm fascial defect, repaired primarily.    ANESTHESIA: General Anesthesia    DRAINS: None    SPECIMEN: Hernia Sac    ESTIMATED BLOOD LOSS: 10cc    COMPLICATIONS: None    TECHNIQUE:   Informed consented was obtained prior to the procedure. All risks, benefits, alternatives were explained in detail to the patient. The patient was wheeled into the operating theatre. Anesthesia induced the patient and performed endotracheal intubation. The patient was placed in the supine position. The abdomen was prepped and draped in the standard fashion. A formal timeout was held confirming correct patient, procedure, site, preoperative antibiotics, VTE prophylaxis, and all operating room staff.     A 4cm curvilinear incision was made just below the umbilicus. The subcutaneous tissue was dissected using electrocautery. The hernia sac was dissected off the fascial defect using a combination of blunt and sharp dissection. The fat containing hernia sac was amputated at the base using electrocautery. A finger sweep was performed confirming the edges of the fascial defect were free. The defect measured 1cm. A total of five 0 ethibond simple interrupted sutures were used to close the fascial defect primarily. The peritoneal undersurface of the umbilicus was cleaned off using metzenbaum scissors. The umbilical skin was tacked to the fascia using a 3-0 vicryl simple  interrupted stitch. The dermis was re-approximated with simple running 3-0 vicryl stitch. The skin was anesthetized with 10cc of local anesthetic. The skin was closed with 4-0 monocryl subcuticular stitch. Dermabond was used to dress the wound. The wound was dressed with gauze and tegaderm.    At the conclusion of the procedure all counts were correct x 2. The patient tolerated the procedure well, was extubated and transferred to PACU in stable condition.

## 2022-10-28 NOTE — TRANSFER OF CARE
Anesthesia Transfer of Care Note    Patient: Humberto Cotto Jr.    Procedure(s) Performed: Procedure(s) (LRB):  REPAIR, HERNIA, UMBILICAL (N/A)    Patient location: PACU    Anesthesia Type: general    Transport from OR: Transported from OR on room air with adequate spontaneous ventilation    Post pain: adequate analgesia    Post assessment: no apparent anesthetic complications and tolerated procedure well    Post vital signs: stable    Level of consciousness: sedated    Nausea/Vomiting: no nausea/vomiting    Complications: none    Transfer of care protocol was followed

## 2022-10-28 NOTE — DISCHARGE SUMMARY
[facility] Services  Discharge Note  Short Stay    Procedure(s) (LRB):  REPAIR, HERNIA, UMBILICAL (N/A)      OUTCOME: Patient tolerated treatment/procedure well without complication and is now ready for discharge.    DISPOSITION: Home or Self Care    FINAL DIAGNOSIS:  <principal problem not specified>    FOLLOWUP: In clinic 2 weeks    DISCHARGE INSTRUCTIONS:    Discharge Procedure Orders   Diet Adult Regular     Lifting restrictions   Order Comments: No more than 10 lbs for 6 weeks     Notify your health care provider if you experience any of the following:  temperature >100.4     Notify your health care provider if you experience any of the following:  severe uncontrolled pain     Notify your health care provider if you experience any of the following:  redness, tenderness, or signs of infection (pain, swelling, redness, odor or green/yellow discharge around incision site)     Notify your health care provider if you experience any of the following:  persistent nausea and vomiting or diarrhea     No dressing needed        TIME SPENT ON DISCHARGE: 15 minutes

## 2022-10-28 NOTE — ANESTHESIA POSTPROCEDURE EVALUATION
Anesthesia Post Evaluation    Patient: Humberto Cotto Jr.    Procedure(s) Performed: Procedure(s) (LRB):  REPAIR, HERNIA, UMBILICAL (N/A)    Final Anesthesia Type: general      Patient location during evaluation: PACU  Patient participation: Yes- Able to Participate  Level of consciousness: awake and responds to stimulation  Post-procedure vital signs: reviewed and stable  Pain management: adequate  Airway patency: patent    PONV status at discharge: No PONV  Anesthetic complications: no      Cardiovascular status: blood pressure returned to baseline  Respiratory status: unassisted  Hydration status: euvolemic  Follow-up not needed.          Vitals Value Taken Time   /87 10/28/22 1230   Temp 36.6 °C (97.9 °F) 10/28/22 1230   Pulse 83 10/28/22 1230   Resp 16 10/28/22 1230   SpO2 95 % 10/28/22 1230         Event Time   Out of Recovery 11:23:00         Pain/Tara Score: Tara Score: 10 (10/28/2022 11:30 AM)  Modified Tara Score: 20 (10/28/2022 12:30 PM)

## 2022-10-31 VITALS
RESPIRATION RATE: 16 BRPM | TEMPERATURE: 98 F | WEIGHT: 227.94 LBS | DIASTOLIC BLOOD PRESSURE: 87 MMHG | SYSTOLIC BLOOD PRESSURE: 130 MMHG | HEART RATE: 83 BPM | OXYGEN SATURATION: 95 % | BODY MASS INDEX: 31.79 KG/M2

## 2022-10-31 LAB
ESTROGEN SERPL-MCNC: NORMAL PG/ML
INSULIN SERPL-ACNC: NORMAL U[IU]/ML
LAB AP CLINICAL INFORMATION: NORMAL
LAB AP GROSS DESCRIPTION: NORMAL
LAB AP REPORT FOOTNOTES: NORMAL
T3RU NFR SERPL: NORMAL %

## 2022-11-10 ENCOUNTER — OFFICE VISIT (OUTPATIENT)
Dept: SURGERY | Facility: CLINIC | Age: 52
End: 2022-11-10
Payer: MEDICAID

## 2022-11-10 ENCOUNTER — TELEPHONE (OUTPATIENT)
Dept: GASTROENTEROLOGY | Facility: CLINIC | Age: 52
End: 2022-11-10
Payer: MEDICAID

## 2022-11-10 VITALS
SYSTOLIC BLOOD PRESSURE: 134 MMHG | OXYGEN SATURATION: 96 % | HEART RATE: 81 BPM | RESPIRATION RATE: 18 BRPM | TEMPERATURE: 98 F | DIASTOLIC BLOOD PRESSURE: 88 MMHG | BODY MASS INDEX: 32.13 KG/M2 | WEIGHT: 237.19 LBS | HEIGHT: 72 IN

## 2022-11-10 DIAGNOSIS — K42.9 UMBILICAL HERNIA WITHOUT OBSTRUCTION AND WITHOUT GANGRENE: ICD-10-CM

## 2022-11-10 DIAGNOSIS — Z12.11 ENCOUNTER FOR SCREENING COLONOSCOPY: Primary | ICD-10-CM

## 2022-11-10 PROCEDURE — 99214 OFFICE O/P EST MOD 30 MIN: CPT | Mod: PBBFAC

## 2022-11-10 NOTE — TELEPHONE ENCOUNTER
Hello,    I receieved a referral on this PT for a screening colonoscopy.  Due to our back log of referrals, we are currently on diversion for screening procedures.  Please order a FIT test or Cologuard (if meets criteria) on PT then refer back if Positive.      You may also refer this patient to an external GI provider for screening procedure.  Please be sure to fax referrals manually as they do not use Epic for referrals.    Please let me know if you have any questions.    For Medicaid patients refer to:  Dr. Florian or Dr. Herman Khan    For Medicare, commercial patients refer to:  [facility] or Gastro clinic    Thank you,    GI Dept. [facility]

## 2022-11-10 NOTE — PROGRESS NOTES
Pt seen by Dr. Rico & Dr. Enriquez; GI referral placed; Pt instructed to return to clinic as needed; Discharge paperwork given w/pt verbalizing understanding

## 2022-11-10 NOTE — PROGRESS NOTES
I have reviewed the notes, assessments, and/or procedures performed by the resident, I concur with her/his documentation of Humberto Cotto Jr..     Carie Cao MD

## 2022-11-10 NOTE — PROGRESS NOTES
[facility] General Surgery Clinic Note    HPI: Humberto Cotto Jr. is 52 year old male with a history of HTN who presents for a 2-week follow up after an umbilical hernia repair. He is doing well today and denies fever, nausea, vomiting, and abdominal pain. He states he would like a referral for a colonoscopy since his last one was over 10 years ago.      PMH:   Past Medical History:   Diagnosis Date    Hypertension       Meds:   Current Outpatient Medications:     amlodipine-benazepril 5-10 mg (LOTREL) 5-10 mg per capsule, Take 1 capsule by mouth once daily., Disp: , Rfl:     desvenlafaxine succinate (PRISTIQ) 50 MG Tb24, Take 50 mg by mouth once daily., Disp: , Rfl:     HYDROcodone-acetaminophen (NORCO) 5-325 mg per tablet, Take 1 tablet by mouth every 6 (six) hours as needed for Pain., Disp: 10 tablet, Rfl: 0    tamsulosin (FLOMAX) 0.4 mg Cap, Take 1 capsule by mouth every evening., Disp: , Rfl:   No current facility-administered medications for this visit.    Facility-Administered Medications Ordered in Other Visits:     LIDOcaine (PF) 10 mg/ml (1%) injection 10 mg, 1 mL, Intradermal, Once, JOE Paul  Allergies:   Review of patient's allergies indicates:   Allergen Reactions    Penicillin g sodium Hives     Social History:   Social History     Tobacco Use    Smoking status: Never    Smokeless tobacco: Never   Substance Use Topics    Alcohol use: Yes     Comment: occasionally    Drug use: Never     Family History:   Family History   Problem Relation Age of Onset    Hypertension Mother      Surgical History:   Past Surgical History:   Procedure Laterality Date    TONSILLECTOMY       Review of Systems:  Skin: No rashes or itching.  Cardiac: Denies chest pain.  Gastrointestinal: Denies nausea, denies vomiting. Denies abdominal pain. Well healed umbilical incision with scar tissue in place.       Objective:    Vitals:  Vitals:    11/10/22 1109   BP: 134/88   Pulse: 81   Resp: 18   Temp: 98.3 °F (36.8 °C)           Physical Exam:  Gen: NAD  Abd: soft, ND, NT. Well-healing umbilical hernia repair incision.   Skin: warm, well perfused    Assessment/Plan:  Humberto Cotto Jr. is 52 year old male with a history of HTN who presents for a 2-week follow up after an umbilical hernia repair. His umbilical incision is healing well and he has no complaints. He would like a referral for a colonoscopy.     - Referred to GI clinic for colonoscopy   - follow up in clinic as needed. Wound healing well  -no heavy lifting for 4 additional weeks  - red flag symptoms discussed with pt for return to clinic/ED: drainage, erythema, nausea, vomiting      April Felix   [facility] General Surgery, MS3  11/10/2022 11:22 AM      I have seen the patient with the medical student. I have reviewed and edited this note as appropriate.     Nata Jorge MD  [facility] General Surgery PGY1

## 2023-04-21 ENCOUNTER — OFFICE VISIT (OUTPATIENT)
Dept: FAMILY MEDICINE | Facility: CLINIC | Age: 53
End: 2023-04-21
Payer: MEDICAID

## 2023-04-21 ENCOUNTER — TELEPHONE (OUTPATIENT)
Dept: FAMILY MEDICINE | Facility: CLINIC | Age: 53
End: 2023-04-21

## 2023-04-21 VITALS
OXYGEN SATURATION: 98 % | WEIGHT: 245 LBS | SYSTOLIC BLOOD PRESSURE: 138 MMHG | DIASTOLIC BLOOD PRESSURE: 87 MMHG | HEIGHT: 72 IN | HEART RATE: 69 BPM | TEMPERATURE: 98 F | BODY MASS INDEX: 33.18 KG/M2 | RESPIRATION RATE: 18 BRPM

## 2023-04-21 DIAGNOSIS — Z76.0 ENCOUNTER FOR MEDICATION REFILL: ICD-10-CM

## 2023-04-21 DIAGNOSIS — I10 PRIMARY HYPERTENSION: ICD-10-CM

## 2023-04-21 DIAGNOSIS — Z00.00 WELLNESS EXAMINATION: Primary | ICD-10-CM

## 2023-04-21 LAB
ALBUMIN SERPL-MCNC: 4.3 G/DL (ref 3.5–5)
ALBUMIN/GLOB SERPL: 1.3 RATIO (ref 1.1–2)
ALP SERPL-CCNC: 78 UNIT/L (ref 40–150)
ALT SERPL-CCNC: 26 UNIT/L (ref 0–55)
APPEARANCE UR: CLEAR
AST SERPL-CCNC: 20 UNIT/L (ref 5–34)
BACTERIA #/AREA URNS AUTO: ABNORMAL /HPF
BILIRUB UR QL STRIP.AUTO: NEGATIVE MG/DL
BILIRUBIN DIRECT+TOT PNL SERPL-MCNC: 0.5 MG/DL
BUN SERPL-MCNC: 16.4 MG/DL (ref 8.4–25.7)
CALCIUM SERPL-MCNC: 9.7 MG/DL (ref 8.4–10.2)
CHLORIDE SERPL-SCNC: 102 MMOL/L (ref 98–107)
CHOLEST SERPL-MCNC: 240 MG/DL
CHOLEST/HDLC SERPL: 5 {RATIO} (ref 0–5)
CO2 SERPL-SCNC: 28 MMOL/L (ref 22–29)
COLOR UR AUTO: ABNORMAL
CREAT SERPL-MCNC: 1.06 MG/DL (ref 0.73–1.18)
ERYTHROCYTE [DISTWIDTH] IN BLOOD BY AUTOMATED COUNT: 11.5 % (ref 11.5–17)
EST. AVERAGE GLUCOSE BLD GHB EST-MCNC: 142.7 MG/DL
GFR SERPLBLD CREATININE-BSD FMLA CKD-EPI: >60 MLS/MIN/1.73/M2
GLOBULIN SER-MCNC: 3.2 GM/DL (ref 2.4–3.5)
GLUCOSE SERPL-MCNC: 121 MG/DL (ref 74–100)
GLUCOSE UR QL STRIP.AUTO: NORMAL MG/DL
HBA1C MFR BLD: 6.6 %
HCT VFR BLD AUTO: 45.1 % (ref 42–52)
HCV AB SERPL QL IA: NONREACTIVE
HDLC SERPL-MCNC: 50 MG/DL (ref 35–60)
HGB BLD-MCNC: 15.3 G/DL (ref 14–18)
HIV 1+2 AB+HIV1 P24 AG SERPL QL IA: NONREACTIVE
HYALINE CASTS #/AREA URNS LPF: ABNORMAL /LPF
KETONES UR QL STRIP.AUTO: NEGATIVE MG/DL
LDLC SERPL CALC-MCNC: 141 MG/DL (ref 50–140)
LEUKOCYTE ESTERASE UR QL STRIP.AUTO: 25 UNIT/L
MCH RBC QN AUTO: 29.6 PG (ref 27–31)
MCHC RBC AUTO-ENTMCNC: 33.9 G/DL (ref 33–36)
MCV RBC AUTO: 87.2 FL (ref 80–94)
MUCOUS THREADS URNS QL MICRO: ABNORMAL /LPF
NITRITE UR QL STRIP.AUTO: NEGATIVE
NRBC BLD AUTO-RTO: 0 %
PH UR STRIP.AUTO: 5.5 [PH]
PLATELET # BLD AUTO: 238 X10(3)/MCL (ref 130–400)
PMV BLD AUTO: 11.1 FL (ref 7.4–10.4)
POTASSIUM SERPL-SCNC: 4.2 MMOL/L (ref 3.5–5.1)
PROT SERPL-MCNC: 7.5 GM/DL (ref 6.4–8.3)
PROT UR QL STRIP.AUTO: NEGATIVE MG/DL
PSA SERPL-MCNC: 0.41 NG/ML
RBC # BLD AUTO: 5.17 X10(6)/MCL (ref 4.7–6.1)
RBC #/AREA URNS AUTO: ABNORMAL /HPF
RBC UR QL AUTO: NEGATIVE UNIT/L
SODIUM SERPL-SCNC: 138 MMOL/L (ref 136–145)
SP GR UR STRIP.AUTO: 1.03
SQUAMOUS #/AREA URNS LPF: ABNORMAL /HPF
TRIGL SERPL-MCNC: 243 MG/DL (ref 34–140)
TSH SERPL-ACNC: 3.15 UIU/ML (ref 0.35–4.94)
UROBILINOGEN UR STRIP-ACNC: NORMAL MG/DL
VLDLC SERPL CALC-MCNC: 49 MG/DL
WBC # SPEC AUTO: 8 X10(3)/MCL (ref 4.5–11.5)
WBC #/AREA URNS AUTO: ABNORMAL /HPF

## 2023-04-21 PROCEDURE — 84443 ASSAY THYROID STIM HORMONE: CPT | Performed by: NURSE PRACTITIONER

## 2023-04-21 PROCEDURE — 99215 OFFICE O/P EST HI 40 MIN: CPT | Mod: PBBFAC | Performed by: NURSE PRACTITIONER

## 2023-04-21 PROCEDURE — 90471 IMMUNIZATION ADMIN: CPT | Mod: PBBFAC

## 2023-04-21 PROCEDURE — 90715 TDAP VACCINE 7 YRS/> IM: CPT | Mod: PBBFAC

## 2023-04-21 PROCEDURE — 3008F BODY MASS INDEX DOCD: CPT | Mod: CPTII,,, | Performed by: NURSE PRACTITIONER

## 2023-04-21 PROCEDURE — G0102 PROSTATE CA SCREENING; DRE: HCPCS | Mod: S$PBB,,, | Performed by: NURSE PRACTITIONER

## 2023-04-21 PROCEDURE — 4010F PR ACE/ARB THEARPY RXD/TAKEN: ICD-10-PCS | Mod: CPTII,,, | Performed by: NURSE PRACTITIONER

## 2023-04-21 PROCEDURE — 4010F ACE/ARB THERAPY RXD/TAKEN: CPT | Mod: CPTII,,, | Performed by: NURSE PRACTITIONER

## 2023-04-21 PROCEDURE — 1160F PR REVIEW ALL MEDS BY PRESCRIBER/CLIN PHARMACIST DOCUMENTED: ICD-10-PCS | Mod: CPTII,,, | Performed by: NURSE PRACTITIONER

## 2023-04-21 PROCEDURE — 80061 LIPID PANEL: CPT | Performed by: NURSE PRACTITIONER

## 2023-04-21 PROCEDURE — 3075F SYST BP GE 130 - 139MM HG: CPT | Mod: CPTII,,, | Performed by: NURSE PRACTITIONER

## 2023-04-21 PROCEDURE — 99386 PREV VISIT NEW AGE 40-64: CPT | Mod: S$PBB,,, | Performed by: NURSE PRACTITIONER

## 2023-04-21 PROCEDURE — G0102 PROSTATE CA SCREENING; DRE: HCPCS | Mod: PBBFAC | Performed by: NURSE PRACTITIONER

## 2023-04-21 PROCEDURE — 36415 COLL VENOUS BLD VENIPUNCTURE: CPT | Performed by: NURSE PRACTITIONER

## 2023-04-21 PROCEDURE — 1159F MED LIST DOCD IN RCRD: CPT | Mod: CPTII,,, | Performed by: NURSE PRACTITIONER

## 2023-04-21 PROCEDURE — 99386 PR PREVENTIVE VISIT,NEW,40-64: ICD-10-PCS | Mod: S$PBB,,, | Performed by: NURSE PRACTITIONER

## 2023-04-21 PROCEDURE — 3075F PR MOST RECENT SYSTOLIC BLOOD PRESS GE 130-139MM HG: ICD-10-PCS | Mod: CPTII,,, | Performed by: NURSE PRACTITIONER

## 2023-04-21 PROCEDURE — 81001 URINALYSIS AUTO W/SCOPE: CPT | Performed by: NURSE PRACTITIONER

## 2023-04-21 PROCEDURE — 87389 HIV-1 AG W/HIV-1&-2 AB AG IA: CPT | Performed by: NURSE PRACTITIONER

## 2023-04-21 PROCEDURE — 3079F PR MOST RECENT DIASTOLIC BLOOD PRESSURE 80-89 MM HG: ICD-10-PCS | Mod: CPTII,,, | Performed by: NURSE PRACTITIONER

## 2023-04-21 PROCEDURE — 84153 ASSAY OF PSA TOTAL: CPT | Performed by: NURSE PRACTITIONER

## 2023-04-21 PROCEDURE — 80053 COMPREHEN METABOLIC PANEL: CPT | Performed by: NURSE PRACTITIONER

## 2023-04-21 PROCEDURE — 83036 HEMOGLOBIN GLYCOSYLATED A1C: CPT | Performed by: NURSE PRACTITIONER

## 2023-04-21 PROCEDURE — 85027 COMPLETE CBC AUTOMATED: CPT | Performed by: NURSE PRACTITIONER

## 2023-04-21 PROCEDURE — G0102: ICD-10-PCS | Mod: S$PBB,,, | Performed by: NURSE PRACTITIONER

## 2023-04-21 PROCEDURE — 1160F RVW MEDS BY RX/DR IN RCRD: CPT | Mod: CPTII,,, | Performed by: NURSE PRACTITIONER

## 2023-04-21 PROCEDURE — 1159F PR MEDICATION LIST DOCUMENTED IN MEDICAL RECORD: ICD-10-PCS | Mod: CPTII,,, | Performed by: NURSE PRACTITIONER

## 2023-04-21 PROCEDURE — 86803 HEPATITIS C AB TEST: CPT | Performed by: NURSE PRACTITIONER

## 2023-04-21 PROCEDURE — 3079F DIAST BP 80-89 MM HG: CPT | Mod: CPTII,,, | Performed by: NURSE PRACTITIONER

## 2023-04-21 PROCEDURE — 3008F PR BODY MASS INDEX (BMI) DOCUMENTED: ICD-10-PCS | Mod: CPTII,,, | Performed by: NURSE PRACTITIONER

## 2023-04-21 RX ORDER — AMLODIPINE AND BENAZEPRIL HYDROCHLORIDE 5; 10 MG/1; MG/1
1 CAPSULE ORAL DAILY
Qty: 90 CAPSULE | Refills: 3 | Status: SHIPPED | OUTPATIENT
Start: 2023-04-21 | End: 2023-10-25 | Stop reason: SDUPTHER

## 2023-04-21 RX ORDER — IBUPROFEN 100 MG/5ML
1000 SUSPENSION, ORAL (FINAL DOSE FORM) ORAL DAILY
COMMUNITY

## 2023-04-21 RX ADMIN — TETANUS TOXOID, REDUCED DIPHTHERIA TOXOID AND ACELLULAR PERTUSSIS VACCINE, ADSORBED 0.5 ML: 5; 2.5; 8; 8; 2.5 SUSPENSION INTRAMUSCULAR at 10:04

## 2023-04-21 NOTE — PROGRESS NOTES
Patient Name: Humberto Cotto Jr.   : 1970  MRN: [id number]     SUBJECTIVE DATA:    CHIEF COMPLAINT:   Humberto Cotto Jr. is a 53 y.o. male who presents to clinic today with Establish Care      HPI:  53-year-old male presents to the clinic to establish care and to complete his wellness yearly physical exam.  Past medical history hypertension.    Social Determinants of Health     Tobacco Use: Low Risk     Smoking Tobacco Use: Never    Smokeless Tobacco Use: Never    Passive Exposure: Not on file   Alcohol Use: Not At Risk    Frequency of Alcohol Consumption: Monthly or less    Average Number of Drinks: 1 or 2    Frequency of Binge Drinking: Never   Financial Resource Strain: Low Risk     Difficulty of Paying Living Expenses: Not hard at all   Food Insecurity: No Food Insecurity    Worried About Running Out of Food in the Last Year: Never true    Ran Out of Food in the Last Year: Never true   Transportation Needs: No Transportation Needs    Lack of Transportation (Medical): No    Lack of Transportation (Non-Medical): No   Physical Activity: Sufficiently Active    Days of Exercise per Week: 4 days    Minutes of Exercise per Session: 90 min   Stress: No Stress Concern Present    Feeling of Stress : Only a little   Social Connections: Socially Integrated    Frequency of Communication with Friends and Family: Three times a week    Frequency of Social Gatherings with Friends and Family: Three times a week    Attends Christianity Services: More than 4 times per year    Active Member of Clubs or Organizations: Yes    Attends Club or Organization Meetings: Never    Marital Status:    Housing Stability: Low Risk     Unable to Pay for Housing in the Last Year: No    Number of Places Lived in the Last Year: 1    Unstable Housing in the Last Year: No   Depression: Low Risk     Last PHQ Score: 0     Gun safety:  Guns are secured at home.  Car safety:  Seat belt used all the time.  Water:  Stay hydrated with fluids, drink  "6-8 cups of water daily.  Smoke :  Does not smoke  Alcohol:  Drink in moderation.  Exercise:  Continue to exercise 30 minutes a day up to 5 days a week.  Stay active.  Nutrition:  Follow low-cholesterol, low-fat, low-salt diet.  Issue in fresh fruits and vegetables.  Dental:  Dentist list handout provided to patient to schedule appointment.  Ophthalmology:  Patient does follow-up with ophthalmologist yearly.  Colorectal screening referral initiated Dr. Florian.  Immunization: Tdap vac. IM given the clinic  Fasting blood work drawn today will notify of test results when they become available available.     Patient denies chest pain, shortness of breath, dyspnea on exertion, palpitations, peripheral edema, abdominal pain, nausea, vomiting, diarrhea, constipation, fatigue, fever, chills, dysuria,  hematuria, melena, or hematochezia.   HPI      ALLERGIES:   Review of patient's allergies indicates:   Allergen Reactions    Penicillin g sodium Hives         ROS:  Review of Systems   All other systems reviewed and are negative.      OBJECTIVE DATA:  Vital signs  Vitals:    04/21/23 0945   BP: 138/87   Pulse: 69   Resp: 18   Temp: 97.9 °F (36.6 °C)   TempSrc: Oral   SpO2: 98%   Weight: 111.1 kg (245 lb)   Height: 5' 11.5" (1.816 m)      Body mass index is 33.69 kg/m².    PHYSICAL EXAM:   Physical Exam  Vitals and nursing note reviewed.   Constitutional:       General: He is awake. He is not in acute distress.     Appearance: Normal appearance. He is well-developed and well-groomed. He is obese. He is not ill-appearing, toxic-appearing or diaphoretic.   HENT:      Head: Normocephalic and atraumatic.      Right Ear: Hearing, tympanic membrane, ear canal and external ear normal. There is no impacted cerumen.      Left Ear: Hearing, tympanic membrane, ear canal and external ear normal. There is no impacted cerumen.      Nose: Nose normal. No congestion or rhinorrhea.      Right Nostril: No epistaxis.      Left Nostril: No " epistaxis.      Right Turbinates: Not swollen.      Left Turbinates: Not swollen.      Mouth/Throat:      Lips: Pink.      Mouth: Mucous membranes are moist.      Dentition: Dental caries present.      Tongue: No lesions. Tongue does not deviate from midline.      Palate: No mass and lesions.      Pharynx: Oropharynx is clear. Uvula midline. No posterior oropharyngeal erythema.   Eyes:      General: No scleral icterus.     Extraocular Movements: Extraocular movements intact.      Conjunctiva/sclera: Conjunctivae normal.      Pupils: Pupils are equal, round, and reactive to light.      Visual Fields: Right eye visual fields normal and left eye visual fields normal.   Neck:      Thyroid: No thyroid mass, thyromegaly or thyroid tenderness.      Vascular: No carotid bruit.      Trachea: Trachea and phonation normal.   Cardiovascular:      Rate and Rhythm: Normal rate and regular rhythm.      Pulses: Normal pulses.           Carotid pulses are 2+ on the right side and 2+ on the left side.       Radial pulses are 2+ on the right side and 2+ on the left side.        Femoral pulses are 2+ on the right side and 2+ on the left side.       Dorsalis pedis pulses are 2+ on the right side and 2+ on the left side.        Posterior tibial pulses are 2+ on the right side and 2+ on the left side.      Heart sounds: Normal heart sounds. No murmur heard.  Pulmonary:      Effort: Pulmonary effort is normal.      Breath sounds: Normal breath sounds and air entry. No wheezing or rhonchi.   Abdominal:      General: Bowel sounds are normal. There is no distension.      Palpations: Abdomen is soft. There is no mass.      Tenderness: There is no abdominal tenderness. There is no right CVA tenderness, left CVA tenderness, guarding or rebound.      Hernia: No hernia is present. There is no hernia in the left inguinal area or right inguinal area.   Genitourinary:     Pubic Area: No rash.       Penis: Normal and circumcised.       Testes: Normal.  Cremasteric reflex is present.      Epididymis:      Right: Normal.      Left: Normal.      Prostate: Normal. Not enlarged, not tender and no nodules present.      Rectum: Normal. No mass, tenderness, anal fissure, external hemorrhoid or internal hemorrhoid. Normal anal tone.       Musculoskeletal:         General: No tenderness. Normal range of motion.      Cervical back: Normal range of motion and neck supple. No rigidity or tenderness.      Right lower leg: No edema.      Left lower leg: No edema.   Lymphadenopathy:      Cervical: No cervical adenopathy.      Lower Body: No right inguinal adenopathy. No left inguinal adenopathy.   Skin:     General: Skin is warm.      Capillary Refill: Capillary refill takes less than 2 seconds.      Findings: No rash.   Neurological:      General: No focal deficit present.      Mental Status: He is alert and oriented to person, place, and time. Mental status is at baseline.      GCS: GCS eye subscore is 4. GCS verbal subscore is 5. GCS motor subscore is 6.      Cranial Nerves: Cranial nerves 2-12 are intact.      Sensory: Sensation is intact.      Motor: Motor function is intact.      Coordination: Coordination is intact.      Gait: Gait is intact. Gait normal.   Psychiatric:         Attention and Perception: Attention and perception normal.         Mood and Affect: Mood and affect normal.         Speech: Speech normal.         Behavior: Behavior normal. Behavior is cooperative.         Thought Content: Thought content normal.         Cognition and Memory: Cognition and memory normal.         Judgment: Judgment normal.        ASSESSMENT/PLAN:  1. Wellness examination  -     Hemoglobin A1C  -     TSH  -     CBC Without Differential  -     Comprehensive Metabolic Panel  -     Lipid Panel  -     Urinalysis, Reflex to Urine Culture  -     Hepatitis C Antibody  -     HIV 1/2 Ag/Ab (4th Gen)  -     PSA, Screening  -     Ambulatory referral/consult to Gastroenterology; Future; Expected  date: 04/28/2023  -     Tdap (BOOSTRIX) vaccine injection 0.5 mL    2. Primary hypertension  -     amlodipine-benazepril 5-10 mg (LOTREL) 5-10 mg per capsule; Take 1 capsule by mouth once daily.  Dispense: 90 capsule; Refill: 3    3. Encounter for medication refill  -     amlodipine-benazepril 5-10 mg (LOTREL) 5-10 mg per capsule; Take 1 capsule by mouth once daily.  Dispense: 90 capsule; Refill: 3           RESULTS:  No results found for this or any previous visit (from the past 1008 hour(s)).      Follow Up:  Follow up in about 6 months (around 10/21/2023).      Previous medical history/lab work/radiology reviewed and considered during medical management decisions.   Medication list reviewed and medication reconciliation performed.  Patient was provided  and care about his/her current diagnosis (es) and medications including risk/benefit and side effects/adverse events, over the counter medication uses/doses, home self-care and contact precautions,  and red flags and indications for when to seek immediate medical attention.   Patient was advised to continue compliance with current medication list and medical recommendations.  Patient dvised continued compliance with recommended eating habits/ diets for medical conditions and exercise 150 minutes/ week (if possible) for medical condition (s).  Educational handouts and instructions on selected disease management in AVS (After Visit Summary).    All of the patient's questions were answered to patient's satisfaction.   The patient was receptive, expressed verbal understanding and agreement the above plan.       This note was created with the assistance of a voice recognition software or phone dictation. There may be transcription errors as a result of using this technology however minimal. Effort has been made to assure accuracy of transcription but any obvious errors or omissions should be clarified with the author of the document

## 2023-04-21 NOTE — PROGRESS NOTES
Please notify patient regarding his blood work test results,  Cholesterol elevated, total cholesterol 240, bad cholesterol .  Remind patient as we discussed in the clinic need to monitor his fat intake, cholesterol intake, start exercising at least 30 minutes a day up to 5 days a week as simple as brisk walking, monitor calorie intake, increase fiber intake to 30 g per day, lose weight.  And add 2nd option to start cholesterol medication.  Hemoglobin A1c indicates for diabetes is elevated at 6.6% the new guidelines the cut off anything equal or greater than 6.5% also your estimated average blood sugar is 142.  As we discussed in the clinic should be less than 110.  Lifestyle modifications, weight loss, re-evaluate on next visit or initiate medications.   Rest of the blood work is within normal range, PSA normal, thyroid function normal, HIV and hep C screening no sign of infection, blood count no sign of anemia, kidney liver functions electrolytes within normal range.    Urinalysis within normal range, no sign of infection.

## 2023-04-21 NOTE — TELEPHONE ENCOUNTER
----- Message from JOE Butler sent at 4/21/2023  2:40 PM CDT -----  Please notify patient regarding his blood work test results,  Cholesterol elevated, total cholesterol 240, bad cholesterol .  Remind patient as we discussed in the clinic need to monitor his fat intake, cholesterol intake, start exercising at least 30 minutes a day up to 5 days a week as simple as brisk walking, monitor calorie intake, increase fiber intake to 30 g per day, lose weight.  And add 2nd option to start cholesterol medication.  Hemoglobin A1c indicates for diabetes is elevated at 6.6% the new guidelines the cut off anything equal or greater than 6.5% also your estimated average blood sugar is 142.  As we discussed in the clinic should be less than 110.  Lifestyle modifications, weight loss, re-evaluate on next visit or initiate medications.   Rest of the blood work is within normal range, PSA normal, thyroid function normal, HIV and hep C screening no sign of infection, blood count no sign of anemia, kidney liver functions electrolytes within normal range.    Urinalysis within normal range, no sign of infection.

## 2023-05-26 ENCOUNTER — OFFICE VISIT (OUTPATIENT)
Dept: URGENT CARE | Facility: CLINIC | Age: 53
End: 2023-05-26
Payer: MEDICAID

## 2023-05-26 VITALS
WEIGHT: 248 LBS | HEIGHT: 71 IN | HEART RATE: 71 BPM | TEMPERATURE: 98 F | OXYGEN SATURATION: 97 % | SYSTOLIC BLOOD PRESSURE: 124 MMHG | DIASTOLIC BLOOD PRESSURE: 82 MMHG | BODY MASS INDEX: 34.72 KG/M2 | RESPIRATION RATE: 20 BRPM

## 2023-05-26 DIAGNOSIS — L98.9 SKIN LESION: Primary | ICD-10-CM

## 2023-05-26 PROCEDURE — 99213 PR OFFICE/OUTPT VISIT, EST, LEVL III, 20-29 MIN: ICD-10-PCS | Mod: S$PBB,,,

## 2023-05-26 PROCEDURE — 99214 OFFICE O/P EST MOD 30 MIN: CPT | Mod: PBBFAC

## 2023-05-26 PROCEDURE — 99213 OFFICE O/P EST LOW 20 MIN: CPT | Mod: S$PBB,,,

## 2023-05-26 NOTE — PROGRESS NOTES
"Subjective:      Patient ID: Humberto Cotto Jr. is a 53 y.o. male.    Vitals:  height is 5' 11" (1.803 m) and weight is 112.5 kg (248 lb). His oral temperature is 98 °F (36.7 °C). His blood pressure is 124/82 and his pulse is 71. His respiration is 20 and oxygen saturation is 97%.     Chief Complaint: skin lesion (Raised area to head)    Pt has a non painful raised lesion to scalp for the last 2 weeks. Unsure if it started after a haircut.       Constitution: Negative.   HENT: Negative.     Neck: neck negative.   Cardiovascular: Negative.    Eyes: Negative.    Respiratory: Negative.     Gastrointestinal: Negative.    Genitourinary: Negative.    Musculoskeletal: Negative.    Skin:  Positive for lesion.   Neurological: Negative.     Objective:     Physical Exam   Constitutional: He is oriented to person, place, and time.   HENT:   Head: Normocephalic.       Nose: Nose normal.   Mouth/Throat: Mucous membranes are moist. Oropharynx is clear.   Eyes: Pupils are equal, round, and reactive to light.   Cardiovascular: Normal rate and normal pulses.   Pulmonary/Chest: Effort normal.   Abdominal: Normal appearance. Soft.   Musculoskeletal: Normal range of motion.         General: Normal range of motion.   Neurological: He is alert and oriented to person, place, and time.   Skin: Skin is warm. lesion   Vitals reviewed.    Assessment:     1. Skin lesion        Plan:       Skin lesion    Pt to see PCP in 2 weeks. Follow up with PCP for further testing.    ER precautions given, patient verbalized understanding.     Please see provided patient education for guidance.    Follow up with PCP or return to clinic if symptoms worsen or do not improve.                    "

## 2023-06-07 ENCOUNTER — OFFICE VISIT (OUTPATIENT)
Dept: FAMILY MEDICINE | Facility: CLINIC | Age: 53
End: 2023-06-07
Payer: MEDICAID

## 2023-06-07 VITALS
SYSTOLIC BLOOD PRESSURE: 130 MMHG | BODY MASS INDEX: 34.58 KG/M2 | RESPIRATION RATE: 18 BRPM | HEIGHT: 71 IN | TEMPERATURE: 98 F | WEIGHT: 247 LBS | DIASTOLIC BLOOD PRESSURE: 84 MMHG | OXYGEN SATURATION: 97 % | HEART RATE: 76 BPM

## 2023-06-07 DIAGNOSIS — L98.9 ENCOUNTER FOR REMOVAL OF SKIN LESION: Primary | ICD-10-CM

## 2023-06-07 DIAGNOSIS — L98.9 SKIN LESION OF SCALP: ICD-10-CM

## 2023-06-07 DIAGNOSIS — L91.8 INFLAMED SKIN TAG: ICD-10-CM

## 2023-06-07 PROCEDURE — 1159F PR MEDICATION LIST DOCUMENTED IN MEDICAL RECORD: ICD-10-PCS | Mod: CPTII,,, | Performed by: NURSE PRACTITIONER

## 2023-06-07 PROCEDURE — 99215 OFFICE O/P EST HI 40 MIN: CPT | Mod: PBBFAC | Performed by: NURSE PRACTITIONER

## 2023-06-07 PROCEDURE — 3008F BODY MASS INDEX DOCD: CPT | Mod: CPTII,,, | Performed by: NURSE PRACTITIONER

## 2023-06-07 PROCEDURE — 4010F ACE/ARB THERAPY RXD/TAKEN: CPT | Mod: CPTII,,, | Performed by: NURSE PRACTITIONER

## 2023-06-07 PROCEDURE — 3079F PR MOST RECENT DIASTOLIC BLOOD PRESSURE 80-89 MM HG: ICD-10-PCS | Mod: CPTII,,, | Performed by: NURSE PRACTITIONER

## 2023-06-07 PROCEDURE — 1160F PR REVIEW ALL MEDS BY PRESCRIBER/CLIN PHARMACIST DOCUMENTED: ICD-10-PCS | Mod: CPTII,,, | Performed by: NURSE PRACTITIONER

## 2023-06-07 PROCEDURE — 3075F PR MOST RECENT SYSTOLIC BLOOD PRESS GE 130-139MM HG: ICD-10-PCS | Mod: CPTII,,, | Performed by: NURSE PRACTITIONER

## 2023-06-07 PROCEDURE — 99214 PR OFFICE/OUTPT VISIT, EST, LEVL IV, 30-39 MIN: ICD-10-PCS | Mod: S$PBB,25,, | Performed by: NURSE PRACTITIONER

## 2023-06-07 PROCEDURE — 3075F SYST BP GE 130 - 139MM HG: CPT | Mod: CPTII,,, | Performed by: NURSE PRACTITIONER

## 2023-06-07 PROCEDURE — 3008F PR BODY MASS INDEX (BMI) DOCUMENTED: ICD-10-PCS | Mod: CPTII,,, | Performed by: NURSE PRACTITIONER

## 2023-06-07 PROCEDURE — 11400 EXC TR-EXT B9+MARG 0.5 CM<: CPT | Mod: PBBFAC | Performed by: NURSE PRACTITIONER

## 2023-06-07 PROCEDURE — 11400 EXCISION OF LESION: ICD-10-PCS | Mod: S$PBB,,, | Performed by: NURSE PRACTITIONER

## 2023-06-07 PROCEDURE — 1160F RVW MEDS BY RX/DR IN RCRD: CPT | Mod: CPTII,,, | Performed by: NURSE PRACTITIONER

## 2023-06-07 PROCEDURE — 1159F MED LIST DOCD IN RCRD: CPT | Mod: CPTII,,, | Performed by: NURSE PRACTITIONER

## 2023-06-07 PROCEDURE — 99214 OFFICE O/P EST MOD 30 MIN: CPT | Mod: S$PBB,25,, | Performed by: NURSE PRACTITIONER

## 2023-06-07 PROCEDURE — 3079F DIAST BP 80-89 MM HG: CPT | Mod: CPTII,,, | Performed by: NURSE PRACTITIONER

## 2023-06-07 PROCEDURE — 4010F PR ACE/ARB THEARPY RXD/TAKEN: ICD-10-PCS | Mod: CPTII,,, | Performed by: NURSE PRACTITIONER

## 2023-06-07 RX ORDER — LIDOCAINE HYDROCHLORIDE 10 MG/ML
1 INJECTION INFILTRATION; PERINEURAL
Status: COMPLETED | OUTPATIENT
Start: 2023-06-07 | End: 2023-06-07

## 2023-06-07 RX ORDER — SILVER NITRATE 38.21; 12.74 MG/1; MG/1
1 STICK TOPICAL ONCE
Status: COMPLETED | OUTPATIENT
Start: 2023-06-07 | End: 2023-06-07

## 2023-06-07 RX ADMIN — LIDOCAINE HYDROCHLORIDE 1 ML: 10 INJECTION INFILTRATION; PERINEURAL at 02:06

## 2023-06-07 RX ADMIN — SILVER NITRATE 1 APPLICATOR: 38.21; 12.74 STICK TOPICAL at 02:06

## 2023-06-07 NOTE — PROGRESS NOTES
"Patient Name: Humberto Cotto Jr.   : 1970  MRN: [id number]     SUBJECTIVE DATA:    CHIEF COMPLAINT:   Humberto Cotto Jr. is a 53 y.o. male who presents to clinic today with Skin Tags        HPI:  53-year-old male presents to the clinic for skin lesion removal-skin tag to his right axilla and right-sided mid back.  Also would like to be evaluated for a skin infection top of head.    Skin tag: Patient state he had has been irritated and bothersome. also at times it gets caught on the clothes.  Requesting removal.  Discussed with patient it does not require pathology evaluation.  Discussed with patient skin tags return and grow in different places.  Patient state he had removed in the past and they returned. Patient verbalized and agreed to plan of care.    Top of head skin lesion:  Patient state skin lesion has been there for quite some time.  Patient state he had a hair cut about a month ago and it seems to be "bigger".  Denies any fever or drainage.  Discussed with patient possibly it is skin wart vs actinic keratoses, but not sure of diagnosis. Discussed with patient will refer to minor surgery for removal.  Avoid direct sunlight, apply sunscreen. Please see photos under media. Questions solicited and answered, patient verbalized.    Patient denies chest pain, shortness of breath, dyspnea on exertion, palpitations, peripheral edema, abdominal pain, nausea, vomiting, diarrhea, constipation, fatigue, fever, chills, dysuria,  hematuria, melena, or hematochezia.   HPI      ALLERGIES:   Review of patient's allergies indicates:   Allergen Reactions    Penicillin g sodium Hives         ROS:  Review of Systems   Skin:  Positive for rash.   All other systems reviewed and are negative.      OBJECTIVE DATA:  Vital signs  Vitals:    23 1341   BP: 130/84   Pulse: 76   Resp: 18   Temp: 98.2 °F (36.8 °C)   TempSrc: Oral   SpO2: 97%   Weight: 112 kg (247 lb)   Height: 5' 11" (1.803 m)      Body mass index is 34.45 " kg/m².    PHYSICAL EXAM:   Physical Exam  Vitals and nursing note reviewed.   Constitutional:       General: He is awake. He is not in acute distress.     Appearance: Normal appearance. He is well-developed, well-groomed and overweight. He is not ill-appearing, toxic-appearing or diaphoretic.   HENT:      Head: Normocephalic and atraumatic.      Right Ear: Tympanic membrane, ear canal and external ear normal. There is no impacted cerumen.      Left Ear: Tympanic membrane, ear canal and external ear normal. There is no impacted cerumen.      Nose: Nose normal. No congestion or rhinorrhea.      Right Nostril: No epistaxis.      Left Nostril: No epistaxis.      Right Turbinates: Not swollen.      Left Turbinates: Not swollen.      Right Sinus: No maxillary sinus tenderness or frontal sinus tenderness.      Left Sinus: No maxillary sinus tenderness or frontal sinus tenderness.      Mouth/Throat:      Lips: Pink.      Mouth: Mucous membranes are moist.      Pharynx: Oropharynx is clear. Uvula midline. No posterior oropharyngeal erythema.   Eyes:      General: Lids are normal. Gaze aligned appropriately. No scleral icterus.     Extraocular Movements: Extraocular movements intact.      Conjunctiva/sclera: Conjunctivae normal.      Pupils: Pupils are equal, round, and reactive to light.   Neck:      Trachea: Trachea and phonation normal.   Cardiovascular:      Rate and Rhythm: Normal rate and regular rhythm.      Pulses: Normal pulses.           Radial pulses are 2+ on the right side and 2+ on the left side.      Heart sounds: Normal heart sounds.   Pulmonary:      Effort: Pulmonary effort is normal.      Breath sounds: Normal breath sounds and air entry.   Abdominal:      Palpations: Abdomen is soft.      Tenderness: There is no abdominal tenderness. There is no right CVA tenderness or left CVA tenderness.   Musculoskeletal:         General: Normal range of motion.      Cervical back: Normal range of motion and neck  supple. No rigidity or tenderness.   Lymphadenopathy:      Cervical: No cervical adenopathy.   Skin:     General: Skin is warm.      Capillary Refill: Capillary refill takes less than 2 seconds.      Findings: Erythema and lesion present.          Neurological:      General: No focal deficit present.      Mental Status: He is alert and oriented to person, place, and time. Mental status is at baseline.      GCS: GCS eye subscore is 4. GCS verbal subscore is 5. GCS motor subscore is 6.      Gait: Gait normal.   Psychiatric:         Attention and Perception: Attention and perception normal.         Mood and Affect: Mood and affect normal.         Speech: Speech normal.         Behavior: Behavior normal. Behavior is cooperative.         Thought Content: Thought content normal.         Cognition and Memory: Cognition and memory normal.         Judgment: Judgment normal.      Excision of Lesion    Date/Time: 6/7/2023 2:00 PM  Performed by: JOE Butler  Authorized by: JOE Butler     Consent Done?:  Yes (Written)  Timeout: prior to procedure the correct patient, procedure, and site was verified    Local anesthesia used?: Yes    Anesthesia:  Local infiltration  Local anesthetic:  Lidocaine 1% without epinephrine  Assistants?: Yes    List of assistants:  Nani Sierra was present for the entire procedure.  : Skin lesion-skin tag.  Body area:  Back / flank (Right axilla.)  Laterality:  Right  : Sitting upright.  Anesthesia:  Local infiltration  Local anesthetic:  Lidocaine 1% without epinephrine  Excision type:  Skin  Malignancy:  Benign  Excision size (cm):  0.2  Scalpel size:  15  Incision type:  Single straight  Specimens?: No     Hemostasis was obtained. (Silver nitrate stick.)  Estimated blood loss (cc):  0.1  Sterile dressings:  Other (comments) (Band-Aid)  Post-op diagnosis:  Same as pre-op diagnosis.   Needle, instrument, and sponge counts were correct.   Patient tolerated the procedure well  with no immediate complications.   Post-operative instructions were provided for the patient.   ASSESSMENT/PLAN:  1. Encounter for removal of skin lesion  Assessment & Plan:  Keep the area clean and covered as needed.  Do not pick on the scab.  Started to bleed apply direct pressure and that should resolve the issue.  Return to clinic if needed.    Orders:  -     LIDOcaine HCL 10 mg/ml (1%) injection 1 mL  -     silver nitrate applicators applicator 1 applicator  -     Excision of Lesion    2. Inflamed skin tag  -     LIDOcaine HCL 10 mg/ml (1%) injection 1 mL  -     silver nitrate applicators applicator 1 applicator  -     Excision of Lesion    3. Skin lesion of scalp  Assessment & Plan:  Referral to Family Medicine- minor surgery initiated.  Please see photos under media.    Orders:  -     Ambulatory referral/consult to Family Practice; Future; Expected date: 06/14/2023           RESULTS:  No results found for this or any previous visit (from the past 1008 hour(s)).      Follow Up:  Keep follow-up appointment.     Previous medical history/lab work/radiology reviewed and considered during medical management decisions.   Medication list reviewed and medication reconciliation performed.  Patient was provided  and care about his/her current diagnosis (es) and medications including risk/benefit and side effects/adverse events, over the counter medication uses/doses, home self-care and contact precautions,  and red flags and indications for when to seek immediate medical attention.   Patient was advised to continue compliance with current medication list and medical recommendations.  Patient dvised continued compliance with recommended eating habits/ diets for medical conditions and exercise 150 minutes/ week (if possible) for medical condition (s).  Educational handouts and instructions on selected disease management in AVS (After Visit Summary).    All of the patient's questions were answered to patient's  satisfaction.   The patient was receptive, expressed verbal understanding and agreement the above plan.     This note was created with the assistance of a voice recognition software or phone dictation. There may be transcription errors as a result of using this technology however minimal. Effort has been made to assure accuracy of transcription but any obvious errors or omissions should be clarified with the author of the document

## 2023-06-07 NOTE — PROCEDURES
Excision of Lesion    Date/Time: 6/7/2023 2:00 PM  Performed by: JOE Butler  Authorized by: JOE Butler     Consent Done?:  Yes (Written)  Timeout: prior to procedure the correct patient, procedure, and site was verified    Local anesthesia used?: Yes    Anesthesia:  Local infiltration  Local anesthetic:  Lidocaine 1% without epinephrine  Assistants?: Yes    List of assistants:  Nani Sierra was present for the entire procedure.  : Skin lesion-skin tag.  Body area:  Back / flank (Right axilla.)  Laterality:  Right  : Sitting upright.  Anesthesia:  Local infiltration  Local anesthetic:  Lidocaine 1% without epinephrine  Excision type:  Skin  Malignancy:  Benign  Excision size (cm):  0.2  Scalpel size:  15  Incision type:  Single straight  Specimens?: No     Hemostasis was obtained. (Silver nitrate stick.)  Estimated blood loss (cc):  0.1  Sterile dressings:  Other (comments) (Band-Aid)  Post-op diagnosis:  Same as pre-op diagnosis.   Needle, instrument, and sponge counts were correct.   Patient tolerated the procedure well with no immediate complications.   Post-operative instructions were provided for the patient.

## 2023-06-07 NOTE — ASSESSMENT & PLAN NOTE
Keep the area clean and covered as needed.  Do not pick on the scab.  If starts to bleed apply direct pressure and that should resolve the issue.  Return to clinic if needed.

## 2023-07-24 PROBLEM — Z00.00 WELLNESS EXAMINATION: Status: RESOLVED | Noted: 2023-04-21 | Resolved: 2023-07-24

## 2023-07-25 ENCOUNTER — PATIENT MESSAGE (OUTPATIENT)
Dept: ADMINISTRATIVE | Facility: HOSPITAL | Age: 53
End: 2023-07-25
Payer: MEDICAID

## 2023-08-29 ENCOUNTER — OFFICE VISIT (OUTPATIENT)
Dept: FAMILY MEDICINE | Facility: CLINIC | Age: 53
End: 2023-08-29
Payer: MEDICAID

## 2023-08-29 VITALS
HEIGHT: 71 IN | BODY MASS INDEX: 34.58 KG/M2 | RESPIRATION RATE: 18 BRPM | HEART RATE: 85 BPM | DIASTOLIC BLOOD PRESSURE: 88 MMHG | SYSTOLIC BLOOD PRESSURE: 128 MMHG | OXYGEN SATURATION: 97 % | TEMPERATURE: 99 F | WEIGHT: 247 LBS

## 2023-08-29 DIAGNOSIS — L57.0 ACTINIC KERATOSIS: Primary | ICD-10-CM

## 2023-08-29 DIAGNOSIS — D18.01 CHERRY HEMANGIOMA: ICD-10-CM

## 2023-08-29 DIAGNOSIS — L73.8 SEBACEOUS HYPERPLASIA OF FACE: ICD-10-CM

## 2023-08-29 DIAGNOSIS — L98.9 SKIN LESION OF SCALP: ICD-10-CM

## 2023-08-29 PROCEDURE — 17004 DESTROY PREMAL LESIONS 15/>: CPT | Mod: PBBFAC

## 2023-08-29 PROCEDURE — 99214 OFFICE O/P EST MOD 30 MIN: CPT | Mod: PBBFAC,25

## 2023-08-29 NOTE — PROGRESS NOTES
"[facility] Minor Surgery Note    ID:  Humberto Cotto Jr.  MRN:  [id number]  8/29/2023    Chief Complaint: Lesion on scalp, AK's on bilateral arms    History of Present Illness:  Humberto Cotto Jr. is a 53 y.o. male who presents to [facility] clinic for lesion on scalp concerning for Actinic keratosis  Patient state skin lesion has been there for several years.  Does landscaping for living, hence  spends extensive time outdoors. Patient state he had a hair cut about two months ago and lesion seems to have increased in size. Denies any fever, chills or drainage from lesion. Also presenting with cherry hemangioma on left side of his forehead and right lower extremity.       Review of patient's allergies indicates:   Allergen Reactions    Penicillin g sodium Hives       Review of Systems:  As per HPI    Physical Exam:  /88   Pulse 85   Temp 98.6 °F (37 °C)   Resp 18   Ht 5' 10.87" (1.8 m)   Wt 112 kg (247 lb)   SpO2 97%   BMI 34.58 kg/m²     Physical Exam  Constitutional:       Appearance: Normal appearance.   Cardiovascular:      Rate and Rhythm: Normal rate and regular rhythm.      Pulses: Normal pulses.      Heart sounds: Normal heart sounds.   Pulmonary:      Effort: Pulmonary effort is normal.      Breath sounds: Normal breath sounds.   Musculoskeletal:         General: Normal range of motion.   Skin:     Findings: Lesion present.      Comments: Multiple red, raised lesions on forehead about 3 mm in diameter on average,   A single single raised lesion, with greasy and stuck-on appearance on central forehead  A single cherry hemangioma about 5 mm in diameter on RLE  A single cherry hemangioma about 3 mm in diameter on left side of the forehead  Multiple scaly lesions, sunburns spots on bilateral arms     Neurological:      General: No focal deficit present.      Mental Status: He is alert and oriented to person, place, and time.   Psychiatric:         Mood and Affect: Mood normal.       Multiple sebaceous " hyperplasia        Cherry Hemangioma RLE        Aks on bilateral arms            Indurated lesion about 1 cm in diameter suspicious for Sk vs AK        Multiple Sebaceous hyperplasia on forehead        Lesion on scalp of head: SK vs AK        Procedure Note:  Procedure: Cryotherapy  Performed by: Dr. Rohit Graff  Supervised by: Dr. Josh Reynoso   Consent: signed consent obtained after discussion of risks, benefits, and alternative treatments  Description:  Liquid nitrogen used for cryotherapy. Tip held 1 cm from lesion and sprayed in freeze-thaw cycle.   The patient tolerated the procedure well with no complications.          Assessment/Plan:  1. Actinic keratosis    2. Skin lesion of scalp    3. Cherry hemangioma    4. Sebaceous hyperplasia of face      Problems: See problems above.              - All lesions frozen in clinic today   - see procedure note above   - Post care instructions and return precautions discussed.              - Came back to clinic to assess response to cryotherapy, consider further management pending response      Follow up in about 1 month (around 9/29/2023).    Rhoit Graff MD  \[facility] [place] [facility]\"" Family Medicine Resident, [facility]

## 2023-08-30 NOTE — PROGRESS NOTES
I have seen the patient, reviewed the resident's history and physical, assessment, plan, and progress note. I have personally interviewed and examined the patient at bedside and: agree with the findings.     Josh Reynoso MD  [facility]

## 2023-09-27 RX ORDER — DESVENLAFAXINE SUCCINATE 50 MG/1
50 TABLET, EXTENDED RELEASE ORAL DAILY
Qty: 30 TABLET | Refills: 2 | OUTPATIENT
Start: 2023-09-27

## 2023-09-27 NOTE — TELEPHONE ENCOUNTER
----- Message from Lacie Kin sent at 9/27/2023  8:47 AM CDT -----  Regarding: Refill  Provider:Amber Lane  [facility] [address]  Last Visit:   Next Visit: 10/27/23  Patient's Contact Number: [phone]    1. Name of Medication: desvenlafaxine succinate (PRISTIQ) 50 MG Tb24  Dosage:   Comments:     2. Name of Medication:   Dosage:   Comments:     3. Name of Medication:   Dosage:   Comments     4. Name of Medication:   Dosage:   Comments:     5. Name of Medication:   Dosage:   Comments:

## 2023-09-28 RX ORDER — DESVENLAFAXINE SUCCINATE 50 MG/1
50 TABLET, EXTENDED RELEASE ORAL DAILY
Qty: 30 TABLET | Refills: 1 | OUTPATIENT
Start: 2023-09-28

## 2023-10-06 ENCOUNTER — OFFICE VISIT (OUTPATIENT)
Dept: FAMILY MEDICINE | Facility: CLINIC | Age: 53
End: 2023-10-06
Payer: MEDICAID

## 2023-10-06 VITALS
WEIGHT: 245.38 LBS | SYSTOLIC BLOOD PRESSURE: 129 MMHG | HEART RATE: 84 BPM | DIASTOLIC BLOOD PRESSURE: 84 MMHG | OXYGEN SATURATION: 100 % | RESPIRATION RATE: 20 BRPM | BODY MASS INDEX: 35.13 KG/M2 | TEMPERATURE: 98 F | HEIGHT: 70 IN

## 2023-10-06 DIAGNOSIS — L91.8 SKIN TAG: ICD-10-CM

## 2023-10-06 DIAGNOSIS — L57.0 ACTINIC KERATOSIS: Primary | ICD-10-CM

## 2023-10-06 DIAGNOSIS — L73.8 SEBACEOUS HYPERPLASIA OF FACE: ICD-10-CM

## 2023-10-06 PROCEDURE — 17003 DESTRUCT PREMALG LES 2-14: CPT | Mod: PBBFAC

## 2023-10-06 PROCEDURE — 17000 DESTRUCT PREMALG LESION: CPT | Mod: PBBFAC

## 2023-10-06 PROCEDURE — 99214 OFFICE O/P EST MOD 30 MIN: CPT | Mod: PBBFAC

## 2023-10-06 NOTE — PROGRESS NOTES
"[facility] Minor Surgery Note    ID:  Humberto Cotto Jr.   MRN:  [id number]     10/6/2023    Chief Complaint:      History of Present Illness:  Humberto Cotto Jr. is a 53 y.o. male who presents to [facility] minor surgery clinic for follow up. Last seen in minor surgery clinic 8/29/23; at that visit, cryotherapy performed to actinic keratoses of arms, lesion of scalp (central forehead - SK vs AK), cherry hemangioma of RLE and left side of forehead, sebaceous hyperplasia of forehead.    Today, pt reports he had success with cryo last time and resolution of most skin lesions. However, has had persistence of AK's on bilateral hands and sebaceous hyperplasia on forehead. Would also like cryotherapy to skin tags on right chest, left arm, and right inguinal area.      Review of patient's allergies indicates:   Allergen Reactions    Penicillin g sodium Hives         Review of Systems:  As per HPI      Physical Exam:  /84 (BP Location: Right arm, Patient Position: Sitting, BP Method: X-Large (Automatic))   Pulse 84   Temp 98 °F (36.7 °C) (Oral)   Resp 20   Ht 5' 10" (1.778 m)   Wt 111.3 kg (245 lb 6.4 oz)   SpO2 100%   BMI 35.21 kg/m²   Physical Exam  Vitals reviewed.   Constitutional:       General: He is not in acute distress.  Skin:     Comments: Rough, scaly lesions consistent with AK - 1 to dorsum of left hand, 2 to dorsum of right hand.    Flesh colored skin tags to left antecubital area, right chest near axilla, and right groin crease.    Multiple areas of sebaceous hyperplasia to forehead; appear to be unchanged from previous exam (see image in media)   Neurological:      Mental Status: He is alert and oriented to person, place, and time.   Psychiatric:         Behavior: Behavior normal.         Thought Content: Thought content normal.          AK of left hand         AK (x2) of right hand        Skin tags (x2) left antecubital fossa        Skin tag to right chest near axilla        Skin tag in right " inguinal region          Procedure Note:  Procedure: cryotherapy  Performed by: Yany Crenshaw MD  Supervised by: Josh Reynoso MD  Consent: signed consent obtained after discussion of risks, benefits, and alternative treatments  Description:  Liquid nitrogen used for cryotherapy. Tip held 1 cm from lesion and sprayed in freeze-thaw cycle.   The patient tolerated the procedure well with no complications.          Assessment/Plan:    Actinic keratosis    Skin tag    Sebaceous hyperplasia of face    - cryotherapy of skin tags, AK's, and sebaceous hyperplasia as noted   - see procedure note above  - Post care instructions and return precautions discussed.    - pt to RTC in about 1 month to assess for results of cryo      Follow up in about 1 month (around 11/6/2023) for f/u cryo.    Yany Crenshaw MD  [facility], [facility]

## 2023-10-25 DIAGNOSIS — Z76.0 ENCOUNTER FOR MEDICATION REFILL: ICD-10-CM

## 2023-10-25 DIAGNOSIS — I10 PRIMARY HYPERTENSION: ICD-10-CM

## 2023-10-26 RX ORDER — DESVENLAFAXINE SUCCINATE 50 MG/1
50 TABLET, EXTENDED RELEASE ORAL DAILY
Qty: 90 TABLET | Refills: 1 | OUTPATIENT
Start: 2023-10-26

## 2023-10-26 RX ORDER — AMLODIPINE AND BENAZEPRIL HYDROCHLORIDE 5; 10 MG/1; MG/1
1 CAPSULE ORAL DAILY
Qty: 90 CAPSULE | Refills: 3 | Status: SHIPPED | OUTPATIENT
Start: 2023-10-26

## 2023-10-26 NOTE — TELEPHONE ENCOUNTER
Deabong Paul,         We received your message requesting treatment. To manage this, we can offer to see you in person, through a virtual visit on video, or through an E-Visit. Based on your message, I feel that your condition can be managed quickly and easily through the E-Visit if this is agreeable with you.  To initiate the E-Visit process in [facility], click here.         What is an E-Visit?  An E-Visit offers clinic care without scheduling a virtual or in-person visit.    What can I expect during an E-Visit?  Once you have agreed to an E-Visit, you will be prompted to complete the E-Visit questionnaire, which will include clinically based questions about yourself, symptoms, and needs. This can take 10-20 minutes to complete.  Like an in-person visit, your care team will evaluate, make a diagnosis, and respond with a care plan and recommendations including testing and medications as indicated within 24 business hours Monday to Friday.  If it becomes clear that you need to be seen virtually or in-person after the E-Visit, your care team will communicate further steps.    Can I use E-Visits for urgent care?  E-Visits should be used only for non-urgent medical conditions or requests.  If you need urgent medical care, please contact your clinic by phone, schedule a same-day appointment online or find a nearby [facility].  For serious medical emergencies, please call 911 immediately.    Will I be billed for an E-Visit?  If the E-Visit results in a clinical evaluation, it will be billable to your insurance.  Because this could be a billable visit, you may also be asked for your insurance details and credit card information, as you would for any other visit or copay.  Much of this is stored in your account, so it should be easy to access or update if needed. You may receive a bill for this service, including any applicable copay amount, after the service is rendered.  If your insurance does not cover the  E-Visit, you will receive a bill after the service is rendered    Please allow up to 24 business hours (Monday-Friday) for a reply. At any point in the E-Visit process, if you have not received a response within 72 hours, please call your clinic.      To initiate the E-Visit process in [facility], click here.

## 2023-10-27 ENCOUNTER — OFFICE VISIT (OUTPATIENT)
Dept: FAMILY MEDICINE | Facility: CLINIC | Age: 53
End: 2023-10-27
Payer: MEDICAID

## 2023-10-27 VITALS
OXYGEN SATURATION: 97 % | DIASTOLIC BLOOD PRESSURE: 88 MMHG | HEIGHT: 70 IN | BODY MASS INDEX: 35.22 KG/M2 | SYSTOLIC BLOOD PRESSURE: 126 MMHG | TEMPERATURE: 98 F | HEART RATE: 73 BPM | RESPIRATION RATE: 18 BRPM | WEIGHT: 246 LBS

## 2023-10-27 DIAGNOSIS — G57.93 NEUROPATHY OF BOTH FEET: ICD-10-CM

## 2023-10-27 DIAGNOSIS — I10 PRIMARY HYPERTENSION: Primary | ICD-10-CM

## 2023-10-27 DIAGNOSIS — Z23 ENCOUNTER FOR ADMINISTRATION OF VACCINE: ICD-10-CM

## 2023-10-27 DIAGNOSIS — R06.83 SNORES: ICD-10-CM

## 2023-10-27 PROBLEM — G62.9 NEUROPATHY: Status: ACTIVE | Noted: 2023-10-27

## 2023-10-27 PROCEDURE — 4010F ACE/ARB THERAPY RXD/TAKEN: CPT | Mod: CPTII,,, | Performed by: NURSE PRACTITIONER

## 2023-10-27 PROCEDURE — 3008F PR BODY MASS INDEX (BMI) DOCUMENTED: ICD-10-PCS | Mod: CPTII,,, | Performed by: NURSE PRACTITIONER

## 2023-10-27 PROCEDURE — 3074F PR MOST RECENT SYSTOLIC BLOOD PRESSURE < 130 MM HG: ICD-10-PCS | Mod: CPTII,,, | Performed by: NURSE PRACTITIONER

## 2023-10-27 PROCEDURE — 99215 OFFICE O/P EST HI 40 MIN: CPT | Mod: PBBFAC | Performed by: NURSE PRACTITIONER

## 2023-10-27 PROCEDURE — 99214 OFFICE O/P EST MOD 30 MIN: CPT | Mod: S$PBB,,, | Performed by: NURSE PRACTITIONER

## 2023-10-27 PROCEDURE — 3044F PR MOST RECENT HEMOGLOBIN A1C LEVEL <7.0%: ICD-10-PCS | Mod: CPTII,,, | Performed by: NURSE PRACTITIONER

## 2023-10-27 PROCEDURE — 3074F SYST BP LT 130 MM HG: CPT | Mod: CPTII,,, | Performed by: NURSE PRACTITIONER

## 2023-10-27 PROCEDURE — 90471 IMMUNIZATION ADMIN: CPT | Mod: PBBFAC

## 2023-10-27 PROCEDURE — 4010F PR ACE/ARB THEARPY RXD/TAKEN: ICD-10-PCS | Mod: CPTII,,, | Performed by: NURSE PRACTITIONER

## 2023-10-27 PROCEDURE — 3079F DIAST BP 80-89 MM HG: CPT | Mod: CPTII,,, | Performed by: NURSE PRACTITIONER

## 2023-10-27 PROCEDURE — 3079F PR MOST RECENT DIASTOLIC BLOOD PRESSURE 80-89 MM HG: ICD-10-PCS | Mod: CPTII,,, | Performed by: NURSE PRACTITIONER

## 2023-10-27 PROCEDURE — 1159F PR MEDICATION LIST DOCUMENTED IN MEDICAL RECORD: ICD-10-PCS | Mod: CPTII,,, | Performed by: NURSE PRACTITIONER

## 2023-10-27 PROCEDURE — 99214 PR OFFICE/OUTPT VISIT, EST, LEVL IV, 30-39 MIN: ICD-10-PCS | Mod: S$PBB,,, | Performed by: NURSE PRACTITIONER

## 2023-10-27 PROCEDURE — 1160F RVW MEDS BY RX/DR IN RCRD: CPT | Mod: CPTII,,, | Performed by: NURSE PRACTITIONER

## 2023-10-27 PROCEDURE — 90686 IIV4 VACC NO PRSV 0.5 ML IM: CPT | Mod: PBBFAC

## 2023-10-27 PROCEDURE — 1159F MED LIST DOCD IN RCRD: CPT | Mod: CPTII,,, | Performed by: NURSE PRACTITIONER

## 2023-10-27 PROCEDURE — 3008F BODY MASS INDEX DOCD: CPT | Mod: CPTII,,, | Performed by: NURSE PRACTITIONER

## 2023-10-27 PROCEDURE — 1160F PR REVIEW ALL MEDS BY PRESCRIBER/CLIN PHARMACIST DOCUMENTED: ICD-10-PCS | Mod: CPTII,,, | Performed by: NURSE PRACTITIONER

## 2023-10-27 PROCEDURE — 3044F HG A1C LEVEL LT 7.0%: CPT | Mod: CPTII,,, | Performed by: NURSE PRACTITIONER

## 2023-10-27 RX ORDER — DESVENLAFAXINE SUCCINATE 50 MG/1
50 TABLET, EXTENDED RELEASE ORAL DAILY
Qty: 90 TABLET | Refills: 3 | Status: SHIPPED | OUTPATIENT
Start: 2023-10-27

## 2023-10-27 RX ADMIN — INFLUENZA VIRUS VACCINE 0.5 ML: 15; 15; 15; 15 SUSPENSION INTRAMUSCULAR at 08:10

## 2023-10-27 NOTE — ASSESSMENT & PLAN NOTE
TSH 3.152.  Patient state he snores at night and requesting sleep study to rule out obstructive sleep apnea.  [place] Sleepiness study score is 3.  Referral initiated.

## 2023-10-27 NOTE — ASSESSMENT & PLAN NOTE
Patient state he takes Pristiq 50 mg p.o. once daily to help with his feet tingling.  Previous PCP had prescribed this medication and he has been doing well without any side effects.  Medication represcribed to be taken once daily as directed.  Questions solicited and answered, patient verbalized and agreed to plan.

## 2023-10-27 NOTE — ASSESSMENT & PLAN NOTE
Controlled, current blood pressure 126/88.  Patient currently on amlodipine 5 mg with benazepril 10 mg p.o. once daily.  Tolerating treatment well without side effects.  Discussed to continue to follow low-salt diet less than 2 g per day if possible, lifestyle modification and stay physically active.Lose weight.  Medication refilled.  Questions solicited and answered, patient verbalized and agreed to plan.

## 2023-10-27 NOTE — PROGRESS NOTES
Patient Name: Humberto Cotto Jr.   : 1970  MRN: [id number]     SUBJECTIVE DATA:    CHIEF COMPLAINT:   Humberto Cotto Jr. is a 53 y.o. male who presents to clinic today with Hypertension        HPI:  53-year-old male presents to the clinic to follow-up on hypertension    Hypertension:  Controlled, current blood pressure 126/88.  Patient currently on amlodipine 5 mg with benazepril 10 mg p.o. once daily.  Tolerating treatment well without side effects.  Discussed to continue to follow low-salt diet less than 2 g per day if possible, lifestyle modification and stay physically active.Lose weight.  Medication refilled.  Questions solicited and answered, patient verbalized and agreed to plan.    Bilateral feet neuropathy:  Patient state he takes Pristiq 50 mg p.o. once daily to help with his feet tingling.  Previous PCP had prescribed this medication and he has been doing well without any side effects.  Medication represcribed to be taken once daily as directed.  Questions solicited and answered, patient verbalized and agreed to plan.    Snores:  TSH 3.152.  Patient state he snores at night and requesting sleep study to rule out obstructive sleep apnea.  [place] Sleepiness study score is 3.  Referral initiated.    Patient denies chest pain, shortness of breath, dyspnea on exertion, palpitations, peripheral edema, abdominal pain, nausea, vomiting, diarrhea, constipation, fatigue, fever, chills, dysuria,  hematuria, melena, or hematochezia.     Care gaps:  Colonoscopy scheduled in 2023.  Patient agreed to receive flu vaccine today.    Patient to return next year in 2024 to complete yearly wellness exam.  Patient to return to clinic sooner if needed.    ALLERGIES:   Review of patient's allergies indicates:   Allergen Reactions    Penicillin g sodium Hives         ROS:  Review of Systems   Constitutional:         Snores.  Requesting sleep study.   All other systems reviewed and are  "negative.        OBJECTIVE DATA:  Vital signs  Vitals:    10/27/23 0738   BP: 126/88   Pulse: 73   Resp: 18   Temp: 98.1 °F (36.7 °C)   TempSrc: Oral   SpO2: 97%   Weight: 111.6 kg (246 lb)   Height: 5' 10" (1.778 m)      Body mass index is 35.3 kg/m².    PHYSICAL EXAM:   Physical Exam  Vitals and nursing note reviewed.   Constitutional:       General: He is awake. He is not in acute distress.     Appearance: Normal appearance. He is well-developed. He is obese. He is not ill-appearing, toxic-appearing or diaphoretic.   HENT:      Head: Normocephalic and atraumatic.      Right Ear: Tympanic membrane, ear canal and external ear normal.      Left Ear: Tympanic membrane, ear canal and external ear normal.      Nose: Nose normal.      Mouth/Throat:      Mouth: Mucous membranes are moist.      Pharynx: Oropharynx is clear. Uvula midline.   Eyes:      General: Lids are normal.      Extraocular Movements: Extraocular movements intact.      Conjunctiva/sclera: Conjunctivae normal.      Pupils: Pupils are equal, round, and reactive to light.   Neck:      Trachea: Trachea and phonation normal.   Cardiovascular:      Rate and Rhythm: Normal rate and regular rhythm.      Pulses: Normal pulses.           Radial pulses are 2+ on the right side and 2+ on the left side.      Heart sounds: Normal heart sounds. No murmur heard.  Pulmonary:      Effort: Pulmonary effort is normal.      Breath sounds: Normal breath sounds and air entry.   Abdominal:      Palpations: Abdomen is soft.      Tenderness: There is no abdominal tenderness. There is no right CVA tenderness or left CVA tenderness.   Musculoskeletal:         General: Normal range of motion.      Cervical back: Normal range of motion and neck supple.      Right lower leg: No edema.      Left lower leg: No edema.   Skin:     General: Skin is warm.      Capillary Refill: Capillary refill takes less than 2 seconds.   Neurological:      General: No focal deficit present.      Mental " Status: He is alert and oriented to person, place, and time. Mental status is at baseline.      GCS: GCS eye subscore is 4. GCS verbal subscore is 5. GCS motor subscore is 6.      Cranial Nerves: No cranial nerve deficit.      Sensory: No sensory deficit.      Motor: No weakness.      Coordination: Coordination normal.      Gait: Gait normal.   Psychiatric:         Attention and Perception: Attention normal.         Mood and Affect: Mood normal.         Speech: Speech normal.         Behavior: Behavior normal. Behavior is cooperative.         Thought Content: Thought content normal.         Cognition and Memory: Cognition normal.         Judgment: Judgment normal.          ASSESSMENT/PLAN:  1. Primary hypertension  Assessment & Plan:  Controlled, current blood pressure 126/88.  Patient currently on amlodipine 5 mg with benazepril 10 mg p.o. once daily.  Tolerating treatment well without side effects.  Discussed to continue to follow low-salt diet less than 2 g per day if possible, lifestyle modification and stay physically active.Lose weight.  Medication refilled.  Questions solicited and answered, patient verbalized and agreed to plan.      2. Neuropathy of both feet  Assessment & Plan:  Patient state he takes Pristiq 50 mg p.o. once daily to help with his feet tingling.  Previous PCP had prescribed this medication and he has been doing well without any side effects.  Medication represcribed to be taken once daily as directed.  Questions solicited and answered, patient verbalized and agreed to plan.    Orders:  -     desvenlafaxine succinate (PRISTIQ) 50 MG Tb24; Take 1 tablet (50 mg total) by mouth once daily.  Dispense: 90 tablet; Refill: 3    3. Snores  Assessment & Plan:  TSH 3.152.  Patient state he snores at night and requesting sleep study to rule out obstructive sleep apnea.  [place] Sleepiness study score is 3.  Referral initiated.    Orders:  -     Ambulatory referral/consult to Sleep Disorders; Future;  Expected date: 11/03/2023    4. Encounter for administration of vaccine  -     influenza (QUADRIVALENT PF) vaccine 0.5 mL           RESULTS:  No results found for this or any previous visit (from the past 1008 hour(s)).      Follow Up:  Follow up in about 6 months (around 4/22/2024).     Face to face time30 minutes, including documentation, chart review, counseling, education, review of test results, relevant medical records, and coordination of care.   I have explained the treatment plan, diagnosis, and prognosis to patient. All questions are answered to the best of my knowledge .     Previous medical history/lab work/radiology reviewed and considered during medical management decisions.   Medication list reviewed and medication reconciliation performed.  Patient was provided  and care about his/her current diagnosis (es) and medications including risk/benefit and side effects/adverse events, over the counter medication uses/doses, home self-care and contact precautions,  and red flags and indications for when to seek immediate medical attention.   Patient was advised to continue compliance with current medication list and medical recommendations.  Patient dvised continued compliance with recommended eating habits/ diets for medical conditions and exercise 150 minutes/ week (if possible) for medical condition (s).  Educational handouts and instructions on selected disease management in AVS (After Visit Summary).    All of the patient's questions were answered to patient's satisfaction.   The patient was receptive, expressed verbal understanding and agreement the above plan.     This note was created with the assistance of a voice recognition software or phone dictation. There may be transcription errors as a result of using this technology however minimal. Effort has been made to assure accuracy of transcription but any obvious errors or omissions should be clarified with the author of the document

## 2023-10-27 NOTE — PATIENT INSTRUCTIONS
Edgar Paul,     If you are due for any health screening(s) below please notify me so we can arrange them to be ordered and scheduled. Most healthy patients at your age complete them, but you are free to accept or refuse.     If you can't do it, I'll definitely understand. If you can, I'd certainly appreciate it!    Tests to Keep You Healthy    Colon Cancer Screening: DUE  Last Blood Pressure <= 139/89 (10/27/2023): Yes      Its time for your colon cancer screening     Colorectal cancer is one of the leading causes of cancer death for men and women but it doesnt have to be. Screenings can prevent colorectal cancer or find it early enough to treat and cure the disease.     Our records indicate that you may be overdue for colon cancer screening. A colonoscopy or stool screening test can help identify patients at risk for developing colon cancer. Cancer screenings save lives, so schedule yours today to stay healthy.     A colonoscopy is the preferred test for detecting colon cancer. It is needed only once every 10 years if results are negative. While you are sedated, a flexible, lighted tube with a tiny camera is inserted into the rectum and advanced through the colon to look for cancers.     An alternative screening test that is used at home and returned to the lab may also be used. It detects hidden blood in bowel movements which could indicate cancer in the colon. If results are positive, you will need a colonoscopy to determine if the blood is a sign of cancer. This type of follow up (diagnostic) colonoscopy usually requires additional copays as required by your insurance provider.     If you recently had your colon cancer screening performed outside of [facility], please let your Health care team know so that they can update your health record. Please contact your PCP if you have any questions.

## 2023-10-28 ENCOUNTER — OFFICE VISIT (OUTPATIENT)
Dept: URGENT CARE | Facility: CLINIC | Age: 53
End: 2023-10-28
Payer: MEDICAID

## 2023-10-28 VITALS
BODY MASS INDEX: 34.93 KG/M2 | HEART RATE: 75 BPM | DIASTOLIC BLOOD PRESSURE: 91 MMHG | OXYGEN SATURATION: 97 % | WEIGHT: 244 LBS | HEIGHT: 70 IN | SYSTOLIC BLOOD PRESSURE: 137 MMHG | TEMPERATURE: 98 F | RESPIRATION RATE: 20 BRPM

## 2023-10-28 DIAGNOSIS — L60.0 ONYCHOCRYPTOSIS: Primary | ICD-10-CM

## 2023-10-28 PROCEDURE — 99214 PR OFFICE/OUTPT VISIT, EST, LEVL IV, 30-39 MIN: ICD-10-PCS | Mod: S$PBB,,, | Performed by: FAMILY MEDICINE

## 2023-10-28 PROCEDURE — 99214 OFFICE O/P EST MOD 30 MIN: CPT | Mod: S$PBB,,, | Performed by: FAMILY MEDICINE

## 2023-10-28 PROCEDURE — 99214 OFFICE O/P EST MOD 30 MIN: CPT | Mod: PBBFAC | Performed by: FAMILY MEDICINE

## 2023-10-28 RX ORDER — CLINDAMYCIN HYDROCHLORIDE 300 MG/1
300 CAPSULE ORAL EVERY 8 HOURS
Qty: 30 CAPSULE | Refills: 0 | Status: SHIPPED | OUTPATIENT
Start: 2023-10-28 | End: 2023-11-07

## 2023-10-28 NOTE — PROGRESS NOTES
"Subjective:       Patient ID: Humberto Cotto Jr. is a 53 y.o. male.    Chief Complaint: Toe Pain (Right great toe pain since last night. Toe is swollen and discolored.)      Toe Pain       53-year-old male with right great toe pain since last night.  Toe is now swollen and is colored.  He believes that it is an ingrown toenail.  Toenail is also thick.  Review of Systems   Integumentary:         As above         Objective:       Vital Signs  Temp: 98 °F (36.7 °C)  Temp Source: Oral  Pulse: 75  Resp: 20  SpO2: 97 %  BP: (!) 137/91  Pain Score: 10-Worst pain ever  Pain Loc: Toe  Height and Weight  Height: 5' 10" (177.8 cm)  Weight: 110.7 kg (244 lb)  BSA (Calculated - sq m): 2.34 sq meters  BMI (Calculated): 35  Weight in (lb) to have BMI = 25: 173.9]  Physical Exam  Vitals reviewed.   Constitutional:       Appearance: Normal appearance.   HENT:      Head: Normocephalic and atraumatic.   Eyes:      Extraocular Movements: Extraocular movements intact.      Conjunctiva/sclera: Conjunctivae normal.   Skin:     Comments: Lateral aspect of right great toe is erythematous and tender to palpation.  Consistent with onychocryptosis.   Neurological:      General: No focal deficit present.      Mental Status: He is alert.   Psychiatric:         Mood and Affect: Mood normal.         Behavior: Behavior normal.         Assessment:       Problem List Items Addressed This Visit    None  Visit Diagnoses       Onychocryptosis    -  Primary    Relevant Medications    clindamycin (CLEOCIN) 300 MG capsule    Other Relevant Orders    Ambulatory referral/consult to [facility]            Plan:    Encouraged to complete antibiotics  ER precautions  Follow-up with [facility]  "

## 2023-12-01 ENCOUNTER — OFFICE VISIT (OUTPATIENT)
Dept: FAMILY MEDICINE | Facility: CLINIC | Age: 53
End: 2023-12-01
Payer: MEDICAID

## 2023-12-01 VITALS
RESPIRATION RATE: 20 BRPM | BODY MASS INDEX: 35.91 KG/M2 | WEIGHT: 250.81 LBS | DIASTOLIC BLOOD PRESSURE: 80 MMHG | HEART RATE: 89 BPM | OXYGEN SATURATION: 96 % | TEMPERATURE: 98 F | HEIGHT: 70 IN | SYSTOLIC BLOOD PRESSURE: 130 MMHG

## 2023-12-01 DIAGNOSIS — L57.0 ACTINIC KERATOSIS: ICD-10-CM

## 2023-12-01 DIAGNOSIS — B35.1 ONYCHOMYCOSIS: Primary | ICD-10-CM

## 2023-12-01 PROCEDURE — 99214 OFFICE O/P EST MOD 30 MIN: CPT | Mod: PBBFAC | Performed by: FAMILY MEDICINE

## 2023-12-01 PROCEDURE — 17003 DESTRUCT PREMALG LES 2-14: CPT | Mod: PBBFAC | Performed by: FAMILY MEDICINE

## 2023-12-01 PROCEDURE — 17000 DESTRUCT PREMALG LESION: CPT | Mod: PBBFAC | Performed by: FAMILY MEDICINE

## 2023-12-01 RX ORDER — CICLOPIROX 80 MG/ML
SOLUTION TOPICAL NIGHTLY
Qty: 6.6 ML | Refills: 5 | Status: SHIPPED | OUTPATIENT
Start: 2023-12-01

## 2023-12-01 NOTE — PROGRESS NOTES
"Subjective:       Patient ID: Humberto Cotto Jr. is a 53 y.o. male.    Chief Complaint: Follow-up (Rt. Ingrown toenail)      Follow-up      54 yo male with actinic keratosis and seborrheic hyperplasia, here for FU cryo.  Also noted left great toenail thickening and brittleness.    Review of Systems   Integumentary:         As above         Objective:       Vital Signs  Temp: 98.1 °F (36.7 °C)  Temp Source: Oral  Pulse: 89  Resp: 20  SpO2: 96 %  BP: 130/80  BP Location: Right arm  Patient Position: Sitting  Pain Score: 0-No pain  Height and Weight  Height: 5' 10" (177.8 cm)  Weight: 113.8 kg (250 lb 12.8 oz)  BSA (Calculated - sq m): 2.37 sq meters  BMI (Calculated): 36  Weight in (lb) to have BMI = 25: 173.9]  Physical Exam  Vitals reviewed.   Constitutional:       Appearance: Normal appearance.   HENT:      Head: Normocephalic and atraumatic.   Eyes:      Extraocular Movements: Extraocular movements intact.      Conjunctiva/sclera: Conjunctivae normal.   Skin:     Comments: Numerous actinic keratoses and seborrheic hyperplasia  Onychomycosis of left great toe   Neurological:      General: No focal deficit present.      Mental Status: He is alert.   Psychiatric:         Mood and Affect: Mood normal.         Behavior: Behavior normal.         Procedure Note:  Procedure: Cryotherapy  Performed by: Dr. Josh Reynoso   Consent: signed consent obtained after discussion of risks, benefits, and alternative treatments  Description:  Liquid nitrogen used for cryotherapy. Tip held 1 cm from lesion and sprayed in 3 freeze-thaw cycles.   The patient tolerated the procedure well with no complications.       Assessment:       Problem List Items Addressed This Visit    None  Visit Diagnoses       Onychomycosis    -  Primary    Relevant Medications    ciclopirox (PENLAC) 8 % Soln            Plan:   ER precautions  FU in 1 month   "

## 2023-12-11 LAB — CRC RECOMMENDATION EXT: NORMAL

## 2023-12-12 ENCOUNTER — DOCUMENTATION ONLY (OUTPATIENT)
Dept: FAMILY MEDICINE | Facility: CLINIC | Age: 53
End: 2023-12-12
Payer: MEDICAID

## 2024-05-17 ENCOUNTER — OFFICE VISIT (OUTPATIENT)
Dept: FAMILY MEDICINE | Facility: CLINIC | Age: 54
End: 2024-05-17
Payer: MEDICAID

## 2024-05-17 VITALS
HEART RATE: 76 BPM | RESPIRATION RATE: 18 BRPM | WEIGHT: 239 LBS | SYSTOLIC BLOOD PRESSURE: 126 MMHG | TEMPERATURE: 98 F | HEIGHT: 70 IN | OXYGEN SATURATION: 95 % | BODY MASS INDEX: 34.22 KG/M2 | DIASTOLIC BLOOD PRESSURE: 88 MMHG

## 2024-05-17 DIAGNOSIS — M62.08 RECTUS DIASTASIS: ICD-10-CM

## 2024-05-17 DIAGNOSIS — Z00.00 ANNUAL PHYSICAL EXAM: Primary | ICD-10-CM

## 2024-05-17 LAB
ALBUMIN SERPL-MCNC: 4.3 G/DL (ref 3.5–5)
ALBUMIN/GLOB SERPL: 1.3 RATIO (ref 1.1–2)
ALP SERPL-CCNC: 74 UNIT/L (ref 40–150)
ALT SERPL-CCNC: 21 UNIT/L (ref 0–55)
ANION GAP SERPL CALC-SCNC: 10 MEQ/L
AST SERPL-CCNC: 16 UNIT/L (ref 5–34)
BACTERIA #/AREA URNS AUTO: ABNORMAL /HPF
BASOPHILS # BLD AUTO: 0.06 X10(3)/MCL
BASOPHILS NFR BLD AUTO: 0.7 %
BILIRUB SERPL-MCNC: 0.7 MG/DL
BILIRUB UR QL STRIP.AUTO: NEGATIVE
BUN SERPL-MCNC: 20.4 MG/DL (ref 8.4–25.7)
CALCIUM SERPL-MCNC: 9.8 MG/DL (ref 8.4–10.2)
CHLORIDE SERPL-SCNC: 103 MMOL/L (ref 98–107)
CHOLEST SERPL-MCNC: 214 MG/DL
CHOLEST/HDLC SERPL: 4 {RATIO} (ref 0–5)
CLARITY UR: CLEAR
CO2 SERPL-SCNC: 26 MMOL/L (ref 22–29)
COLOR UR AUTO: ABNORMAL
CREAT SERPL-MCNC: 1.16 MG/DL (ref 0.73–1.18)
CREAT/UREA NIT SERPL: 18
EOSINOPHIL # BLD AUTO: 0.15 X10(3)/MCL (ref 0–0.9)
EOSINOPHIL NFR BLD AUTO: 1.7 %
ERYTHROCYTE [DISTWIDTH] IN BLOOD BY AUTOMATED COUNT: 12 % (ref 11.5–17)
GFR SERPLBLD CREATININE-BSD FMLA CKD-EPI: >60 ML/MIN/1.73/M2
GLOBULIN SER-MCNC: 3.3 GM/DL (ref 2.4–3.5)
GLUCOSE SERPL-MCNC: 115 MG/DL (ref 74–100)
GLUCOSE UR QL STRIP: NORMAL
HCT VFR BLD AUTO: 43.9 % (ref 42–52)
HDLC SERPL-MCNC: 48 MG/DL (ref 35–60)
HGB BLD-MCNC: 15.4 G/DL (ref 14–18)
HGB UR QL STRIP: NEGATIVE
HYALINE CASTS #/AREA URNS LPF: ABNORMAL /LPF
IMM GRANULOCYTES # BLD AUTO: 0.04 X10(3)/MCL (ref 0–0.04)
IMM GRANULOCYTES NFR BLD AUTO: 0.4 %
KETONES UR QL STRIP: NEGATIVE
LDLC SERPL CALC-MCNC: 136 MG/DL (ref 50–140)
LEUKOCYTE ESTERASE UR QL STRIP: NEGATIVE
LYMPHOCYTES # BLD AUTO: 2.92 X10(3)/MCL (ref 0.6–4.6)
LYMPHOCYTES NFR BLD AUTO: 32.6 %
MCH RBC QN AUTO: 30.4 PG (ref 27–31)
MCHC RBC AUTO-ENTMCNC: 35.1 G/DL (ref 33–36)
MCV RBC AUTO: 86.8 FL (ref 80–94)
MONOCYTES # BLD AUTO: 0.58 X10(3)/MCL (ref 0.1–1.3)
MONOCYTES NFR BLD AUTO: 6.5 %
MUCOUS THREADS URNS QL MICRO: ABNORMAL /LPF
NEUTROPHILS # BLD AUTO: 5.22 X10(3)/MCL (ref 2.1–9.2)
NEUTROPHILS NFR BLD AUTO: 58.1 %
NITRITE UR QL STRIP: NEGATIVE
NRBC BLD AUTO-RTO: 0 %
PH UR STRIP: 5.5 [PH]
PLATELET # BLD AUTO: 229 X10(3)/MCL (ref 130–400)
PMV BLD AUTO: 10.8 FL (ref 7.4–10.4)
POTASSIUM SERPL-SCNC: 3.9 MMOL/L (ref 3.5–5.1)
PROT SERPL-MCNC: 7.6 GM/DL (ref 6.4–8.3)
PROT UR QL STRIP: NEGATIVE
PSA SERPL-MCNC: 0.35 NG/ML
RBC # BLD AUTO: 5.06 X10(6)/MCL (ref 4.7–6.1)
RBC #/AREA URNS AUTO: ABNORMAL /HPF
SODIUM SERPL-SCNC: 139 MMOL/L (ref 136–145)
SP GR UR STRIP.AUTO: 1.03 (ref 1–1.03)
SQUAMOUS #/AREA URNS LPF: ABNORMAL /HPF
TRIGL SERPL-MCNC: 149 MG/DL (ref 34–140)
TSH SERPL-ACNC: 1.59 UIU/ML (ref 0.35–4.94)
UROBILINOGEN UR STRIP-ACNC: NORMAL
VLDLC SERPL CALC-MCNC: 30 MG/DL
WBC # SPEC AUTO: 8.97 X10(3)/MCL (ref 4.5–11.5)
WBC #/AREA URNS AUTO: ABNORMAL /HPF

## 2024-05-17 PROCEDURE — 3079F DIAST BP 80-89 MM HG: CPT | Mod: CPTII,,, | Performed by: NURSE PRACTITIONER

## 2024-05-17 PROCEDURE — 84443 ASSAY THYROID STIM HORMONE: CPT | Performed by: NURSE PRACTITIONER

## 2024-05-17 PROCEDURE — 4010F ACE/ARB THERAPY RXD/TAKEN: CPT | Mod: CPTII,,, | Performed by: NURSE PRACTITIONER

## 2024-05-17 PROCEDURE — 99396 PREV VISIT EST AGE 40-64: CPT | Mod: S$PBB,,, | Performed by: NURSE PRACTITIONER

## 2024-05-17 PROCEDURE — 80053 COMPREHEN METABOLIC PANEL: CPT | Performed by: NURSE PRACTITIONER

## 2024-05-17 PROCEDURE — 84153 ASSAY OF PSA TOTAL: CPT | Performed by: NURSE PRACTITIONER

## 2024-05-17 PROCEDURE — 81001 URINALYSIS AUTO W/SCOPE: CPT | Performed by: NURSE PRACTITIONER

## 2024-05-17 PROCEDURE — 3008F BODY MASS INDEX DOCD: CPT | Mod: CPTII,,, | Performed by: NURSE PRACTITIONER

## 2024-05-17 PROCEDURE — 99214 OFFICE O/P EST MOD 30 MIN: CPT | Mod: PBBFAC | Performed by: NURSE PRACTITIONER

## 2024-05-17 PROCEDURE — 3074F SYST BP LT 130 MM HG: CPT | Mod: CPTII,,, | Performed by: NURSE PRACTITIONER

## 2024-05-17 PROCEDURE — 80061 LIPID PANEL: CPT | Performed by: NURSE PRACTITIONER

## 2024-05-17 PROCEDURE — 1159F MED LIST DOCD IN RCRD: CPT | Mod: CPTII,,, | Performed by: NURSE PRACTITIONER

## 2024-05-17 PROCEDURE — 1160F RVW MEDS BY RX/DR IN RCRD: CPT | Mod: CPTII,,, | Performed by: NURSE PRACTITIONER

## 2024-05-17 PROCEDURE — 36415 COLL VENOUS BLD VENIPUNCTURE: CPT | Performed by: NURSE PRACTITIONER

## 2024-05-17 PROCEDURE — 85025 COMPLETE CBC W/AUTO DIFF WBC: CPT | Performed by: NURSE PRACTITIONER

## 2024-05-17 NOTE — PROGRESS NOTES
Patient Name: Humberto Cotto Jr.   : 1970  MRN: [id number]     SUBJECTIVE DATA:    CHIEF COMPLAINT:   Humberto Cotto Jr. is a 54 y.o. male who presents to clinic today with Annual Exam (And possible abdominal hernia.)        HPI:  54-year-old male presents to the clinic to complete his yearly wellness exam and also would like to discuss possible abdominal hernia.    Abdominal hernia:  Patient state he noticed a bulging to mid abdomen sit up from flat to sitting position.  - On exam Diastasis recti  noted.  Explained to patientIt occurs when the rectus abdominis muscles (six-pack ab muscles) of the linea alba which is the connective tissue holding the 2 sides together  being stretched over time.  - Discussed with patient risk factors such as excessive obesity, and expanded muscle  which forces the abdomen muscle to separate.  Denies any pain with palpation.  Denies any sudden bulging like a hernia.  - youTube video utilized to explain what is diastasis recti.  -Patient to return to clinic if symptom worsens, pain, nausea or discomfort.cti at bedside.    Patient verbalized understanding.    Social Determinants of Health     Tobacco Use: Low Risk  (2024)    Patient History     Smoking Tobacco Use: Never     Smokeless Tobacco Use: Never     Passive Exposure: Not on file   Alcohol Use: Not At Risk (2023)    AUDIT-C     Frequency of Alcohol Consumption: Monthly or less     Average Number of Drinks: 1 or 2     Frequency of Binge Drinking: Never   Financial Resource Strain: Low Risk  (2023)    Overall Financial Resource Strain (CARDIA)     Difficulty of Paying Living Expenses: Not hard at all   Food Insecurity: No Food Insecurity (2023)    Hunger Vital Sign     Worried About Running Out of Food in the Last Year: Never true     Ran Out of Food in the Last Year: Never true   Transportation Needs: No Transportation Needs (2023)    PRAPARE - Transportation     Lack of Transportation (Medical): No      Lack of Transportation (Non-Medical): No   Physical Activity: Sufficiently Active (4/21/2023)    Exercise Vital Sign     Days of Exercise per Week: 4 days     Minutes of Exercise per Session: 90 min   Stress: No Stress Concern Present (4/21/2023)    [employer] - Occupational Stress Questionnaire     Feeling of Stress : Only a little   Housing Stability: Low Risk  (4/21/2023)    Housing Stability Vital Sign     Unable to Pay for Housing in the Last Year: No     Number of Places Lived in the Last Year: 1     Unstable Housing in the Last Year: No   Depression: Low Risk  (4/17/2024)    Depression     Last PHQ-4: Flowsheet Data: 0   Utilities: Not At Risk (5/17/2024)    [facility] Utilities     Threatened with loss of utilities: No   Health Literacy: Adequate Health Literacy (5/17/2024)     Health Literacy     Frequency of need for help with medical instructions: Never   Social Isolation: Socially Integrated (5/17/2024)    Social Isolation     Social Isolation: 1     Car safety:  Seat belt used all the time.  Water:  Stay hydrated with fluids, drink 6-8 cups of water daily.  Alcohol:  Drink in moderation.  Exercise: exercise 30 minutes a day up to 5 days a week.  Stay active.  Lose weight.  Nutrition:  Follow low-cholesterol, low-fat, low-salt diet.  Eat fresh fruits and vegetables.  Keep in mind portion size.  Dental:  Patient does follow-up with a dentist yearly.  Ophthalmology:  Patient does follow-up with ophthalmologist yearly.  Immunizations:  Declines shingles vaccine.  Advise patient he can return to clinic for completion or he can go to any nearest pharmacy for completion.  Colorectal cancer screening:  Next appointment December 11, 2023.  PSA :next collection May 17, 2025.    Lab work drawn today will notify of test results when they become available.     Patient denies chest pain, shortness of breath, dyspnea on exertion, palpitations, peripheral edema, abdominal pain, nausea,  "vomiting, diarrhea, constipation, fatigue, fever, chills, dysuria,  hematuria, dark stools or bloody stools.        ALLERGIES:   Review of patient's allergies indicates:   Allergen Reactions    Penicillin g sodium Hives         ROS:  Review of Systems   All other systems reviewed and are negative.        OBJECTIVE DATA:  Vital signs  Vitals:    05/17/24 1157   BP: 126/88   Pulse: 76   Resp: 18   Temp: 98 °F (36.7 °C)   TempSrc: Oral   SpO2: 95%   Weight: 108.4 kg (239 lb)   Height: 5' 10" (1.778 m)      Body mass index is 34.29 kg/m².    PHYSICAL EXAM:   Physical Exam  Vitals and nursing note reviewed.   Constitutional:       General: He is awake. He is not in acute distress.     Appearance: Normal appearance. He is well-developed and well-groomed. He is obese. He is not ill-appearing, toxic-appearing or diaphoretic.   HENT:      Head: Normocephalic and atraumatic.      Right Ear: Tympanic membrane, ear canal and external ear normal.      Left Ear: Tympanic membrane, ear canal and external ear normal.      Nose: Nose normal.      Mouth/Throat:      Lips: Pink.      Mouth: Mucous membranes are moist.      Tongue: No lesions. Tongue does not deviate from midline.      Palate: No mass and lesions.      Pharynx: Oropharynx is clear. Uvula midline.   Eyes:      General: Lids are normal. Gaze aligned appropriately. No scleral icterus.     Extraocular Movements: Extraocular movements intact.      Conjunctiva/sclera: Conjunctivae normal.      Pupils: Pupils are equal, round, and reactive to light.      Visual Fields: Right eye visual fields normal and left eye visual fields normal.   Neck:      Thyroid: No thyroid mass, thyromegaly or thyroid tenderness.      Vascular: No carotid bruit.      Trachea: Trachea and phonation normal.   Cardiovascular:      Rate and Rhythm: Normal rate and regular rhythm.      Pulses: Normal pulses.           Carotid pulses are 2+ on the right side and 2+ on the left side.       Radial pulses " are 2+ on the right side and 2+ on the left side.        Femoral pulses are 2+ on the right side and 2+ on the left side.       Dorsalis pedis pulses are 2+ on the right side and 2+ on the left side.        Posterior tibial pulses are 2+ on the right side and 2+ on the left side.      Heart sounds: Normal heart sounds, S1 normal and S2 normal. No murmur heard.  Pulmonary:      Effort: Pulmonary effort is normal.      Breath sounds: Normal breath sounds and air entry. No wheezing or rhonchi.   Abdominal:      General: Bowel sounds are normal. There is no distension.      Palpations: Abdomen is soft. There is no mass.      Tenderness: There is no abdominal tenderness. There is no right CVA tenderness, left CVA tenderness, guarding or rebound.      Hernia: No hernia is present. There is no hernia in the umbilical area, ventral area, left inguinal area, right femoral area, left femoral area or right inguinal area.       Genitourinary:     Pubic Area: No rash.       Penis: Normal and circumcised.       Testes: Normal. Cremasteric reflex is present.      Epididymis:      Right: Normal.      Left: Normal.      Prostate: Normal. Not enlarged, not tender and no nodules present.      Rectum: Normal. Guaiac result negative. No mass, tenderness, anal fissure, external hemorrhoid or internal hemorrhoid. Normal anal tone.   Musculoskeletal:         General: Normal range of motion.      Cervical back: Full passive range of motion without pain, normal range of motion and neck supple. No rigidity or tenderness.      Right lower leg: No edema.      Left lower leg: No edema.   Lymphadenopathy:      Cervical: No cervical adenopathy.      Lower Body: No right inguinal adenopathy. No left inguinal adenopathy.   Skin:     General: Skin is warm.      Capillary Refill: Capillary refill takes less than 2 seconds.      Findings: No rash.   Neurological:      General: No focal deficit present.      Mental Status: He is alert and oriented to  person, place, and time. Mental status is at baseline.      GCS: GCS eye subscore is 4. GCS verbal subscore is 5. GCS motor subscore is 6.      Cranial Nerves: Cranial nerves 2-12 are intact. No cranial nerve deficit.      Sensory: Sensation is intact. No sensory deficit.      Motor: Motor function is intact. No weakness.      Coordination: Coordination is intact. Coordination normal.      Gait: Gait is intact. Gait normal.   Psychiatric:         Attention and Perception: Attention and perception normal.         Mood and Affect: Mood and affect normal.         Behavior: Behavior normal. Behavior is cooperative.         Thought Content: Thought content normal.         Cognition and Memory: Cognition and memory normal.         Judgment: Judgment normal.          ASSESSMENT/PLAN:  1. Annual physical exam  -     TSH  -     CBC Auto Differential  -     Comprehensive Metabolic Panel  -     PSA, Screening  -     Lipid Panel  -     Urinalysis, Reflex to Urine Culture    2. Rectus diastasis  Assessment & Plan:   Patient state he noticed a bulging to mid abdomen sit up from flat to sitting position.  - On exam Diastasis recti  noted.  Explained to patientIt occurs when the rectus abdominis muscles (six-pack ab muscles) of the linea alba which is the connective tissue holding the 2 sides together  being stretched over time.  - Discussed with patient risk factors such as excessive obesity, and expanded muscle  which forces the abdomen muscle to separate.  Denies any pain with palpation.  Denies any sudden bulging like a hernia.  - youTube video utilized to explain what is diastasis recti.  -Patient to return to clinic if symptom worsens, pain, nausea or discomfort.cti at bedside.    Patient verbalized understanding.             RESULTS:  Recent Results (from the past 1008 hour(s))   TSH    Collection Time: 05/17/24 12:43 PM   Result Value Ref Range    TSH 1.587 0.350 - 4.940 uIU/mL   Comprehensive Metabolic Panel    Collection  Time: 05/17/24 12:43 PM   Result Value Ref Range    Sodium 139 136 - 145 mmol/L    Potassium 3.9 3.5 - 5.1 mmol/L    Chloride 103 98 - 107 mmol/L    CO2 26 22 - 29 mmol/L    Glucose 115 (H) 74 - 100 mg/dL    Blood Urea Nitrogen 20.4 8.4 - 25.7 mg/dL    Creatinine 1.16 0.73 - 1.18 mg/dL    Calcium 9.8 8.4 - 10.2 mg/dL    Protein Total 7.6 6.4 - 8.3 gm/dL    Albumin 4.3 3.5 - 5.0 g/dL    Globulin 3.3 2.4 - 3.5 gm/dL    Albumin/Globulin Ratio 1.3 1.1 - 2.0 ratio    Bilirubin Total 0.7 <=1.5 mg/dL    ALP 74 40 - 150 unit/L    ALT 21 0 - 55 unit/L    AST 16 5 - 34 unit/L    eGFR >60 mL/min/1.73/m2    Anion Gap 10.0 mEq/L    BUN/Creatinine Ratio 18    PSA, Screening    Collection Time: 05/17/24 12:43 PM   Result Value Ref Range    Prostate Specific Antigen 0.35 <=4.00 ng/mL   Lipid Panel    Collection Time: 05/17/24 12:43 PM   Result Value Ref Range    Cholesterol Total 214 (H) <=200 mg/dL    HDL Cholesterol 48 35 - 60 mg/dL    Triglyceride 149 (H) 34 - 140 mg/dL    Cholesterol/HDL Ratio 4 0 - 5    Very Low Density Lipoprotein 30     LDL Cholesterol 136.00 50.00 - 140.00 mg/dL   Urinalysis, Reflex to Urine Culture    Collection Time: 05/17/24 12:43 PM    Specimen: Urine   Result Value Ref Range    Color, UA Light-Yellow Yellow, Light-Yellow, Dark Yellow, Gabriella, Straw    Appearance, UA Clear Clear    Specific Gravity, UA 1.029 1.005 - 1.030    pH, UA 5.5 5.0 - 8.5    Protein, UA Negative Negative    Glucose, UA Normal Negative, Normal    Ketones, UA Negative Negative    Blood, UA Negative Negative    Bilirubin, UA Negative Negative    Urobilinogen, UA Normal 0.2, 1.0, Normal    Nitrites, UA Negative Negative    Leukocyte Esterase, UA Negative Negative    WBC, UA 0-5 None Seen, 0-2, 3-5, 0-5 /HPF    Bacteria, UA Trace (A) None Seen /HPF    Squamous Epithelial Cells, UA None Seen None Seen /HPF    Mucous, UA Trace (A) None Seen /LPF    Hyaline Casts, UA None Seen None Seen /lpf    RBC, UA 0-5 None Seen, 0-2, 3-5, 0-5 /HPF    CBC with Differential    Collection Time: 05/17/24 12:43 PM   Result Value Ref Range    WBC 8.97 4.50 - 11.50 x10(3)/mcL    RBC 5.06 4.70 - 6.10 x10(6)/mcL    Hgb 15.4 14.0 - 18.0 g/dL    Hct 43.9 42.0 - 52.0 %    MCV 86.8 80.0 - 94.0 fL    MCH 30.4 27.0 - 31.0 pg    MCHC 35.1 33.0 - 36.0 g/dL    RDW 12.0 11.5 - 17.0 %    Platelet 229 130 - 400 x10(3)/mcL    MPV 10.8 (H) 7.4 - 10.4 fL    Neut % 58.1 %    Lymph % 32.6 %    Mono % 6.5 %    Eos % 1.7 %    Basophil % 0.7 %    Lymph # 2.92 0.6 - 4.6 x10(3)/mcL    Neut # 5.22 2.1 - 9.2 x10(3)/mcL    Mono # 0.58 0.1 - 1.3 x10(3)/mcL    Eos # 0.15 0 - 0.9 x10(3)/mcL    Baso # 0.06 <=0.2 x10(3)/mcL    IG# 0.04 0 - 0.04 x10(3)/mcL    IG% 0.4 %    NRBC% 0.0 %         Follow Up:  No follow-ups on file.      Previous medical history/lab work/radiology reviewed and considered during medical management decisions.   Medication list reviewed and medication reconciliation performed.  Patient was provided  and care about his/her current diagnosis (es) and medications including risk/benefit and side effects/adverse events, over the counter medication uses/doses, home self-care and contact precautions,  and red flags and indications for when to seek immediate medical attention.   Patient was advised to continue compliance with current medication list and medical recommendations.  Patient dvised continued compliance with recommended eating habits/ diets for medical conditions and exercise 150 minutes/ week (if possible) for medical condition (s).  Educational handouts and instructions on selected disease management in AVS (After Visit Summary).    All of the patient's questions were answered to patient's satisfaction.   The patient was receptive, expressed verbal understanding and agreement the above plan.      This note was created with the assistance of a voice recognition software or phone dictation. There may be transcription errors as a result of using this technology  however minimal. Effort has been made to assure accuracy of transcription but any obvious errors or omissions should be clarified with the author of the document

## 2024-05-20 PROBLEM — M62.08 RECTUS DIASTASIS: Status: ACTIVE | Noted: 2024-05-20

## 2024-05-20 NOTE — ASSESSMENT & PLAN NOTE
Patient state he noticed a bulging to mid abdomen sit up from flat to sitting position.  - On exam Diastasis recti  noted.  Explained to patientIt occurs when the rectus abdominis muscles (six-pack ab muscles) of the linea alba which is the connective tissue holding the 2 sides together  being stretched over time.  - Discussed with patient risk factors such as excessive obesity, and expanded muscle  which forces the abdomen muscle to separate.  Denies any pain with palpation.  Denies any sudden bulging like a hernia.  - youTube video utilized to explain what is diastasis recti.  -Patient to return to clinic if symptom worsens, pain, nausea or discomfort.cti at bedside.    Patient verbalized understanding.

## 2024-05-20 NOTE — PROGRESS NOTES
PLEASE CALL PATIENTS WITH RESULTS,   Electrolytes, kidney functions, liver functions within normal range.  Blood count no sign of anemia or infection.  Prostate cancer screening negative.  Thyroid function test within normal range.  Urinalysis no sign of infection.  Cholesterol much improvement, continue to follow low-fat, low-cholesterol diet.  Keep fiber intake between 25-30 g per day if possible.  Stay physically active, lose weight, exercise at least 30 minutes a day up to 5 days a week as simple as brisk walking.  Continue with current medication regiment.

## 2024-05-22 ENCOUNTER — TELEPHONE (OUTPATIENT)
Dept: FAMILY MEDICINE | Facility: CLINIC | Age: 54
End: 2024-05-22
Payer: MEDICAID

## 2024-05-22 NOTE — TELEPHONE ENCOUNTER
Attempted to call patient no answer left message.    ----- Message from JOE Butler sent at 5/20/2024 12:51 PM CDT -----  PLEASE CALL PATIENTS WITH RESULTS,   Electrolytes, kidney functions, liver functions within normal range.  Blood count no sign of anemia or infection.  Prostate cancer screening negative.  Thyroid function test within normal range.  Urinalysis no sign of infection.  Cholesterol much improvement, continue to follow low-fat, low-cholesterol diet.  Keep fiber intake between 25-30 g per day if possible.  Stay physically active, lose weight, exercise at least 30 minutes a day up to 5 days a week as simple as brisk walking.  Continue with current medication regiment.

## 2024-05-23 ENCOUNTER — TELEPHONE (OUTPATIENT)
Dept: FAMILY MEDICINE | Facility: CLINIC | Age: 54
End: 2024-05-23
Payer: MEDICAID

## 2024-05-23 NOTE — TELEPHONE ENCOUNTER
Informed patient that lab results are normal stay physically active, lose weight exercise at least 30 minutes a day up to 5 days a week. And continue with current medication regiment.  Patient voiced understanding about results.    ----- Message from JOE Butler sent at 5/20/2024 12:51 PM CDT -----  PLEASE CALL PATIENTS WITH RESULTS,   Electrolytes, kidney functions, liver functions within normal range.  Blood count no sign of anemia or infection.  Prostate cancer screening negative.  Thyroid function test within normal range.  Urinalysis no sign of infection.  Cholesterol much improvement, continue to follow low-fat, low-cholesterol diet.  Keep fiber intake between 25-30 g per day if possible.  Stay physically active, lose weight, exercise at least 30 minutes a day up to 5 days a week as simple as brisk walking.  Continue with current medication regiment.

## 2024-08-19 PROBLEM — Z00.00 ANNUAL PHYSICAL EXAM: Status: RESOLVED | Noted: 2024-05-17 | Resolved: 2024-08-19

## 2024-11-04 ENCOUNTER — TELEPHONE (OUTPATIENT)
Dept: FAMILY MEDICINE | Facility: CLINIC | Age: 54
End: 2024-11-04
Payer: MEDICAID

## 2024-11-06 DIAGNOSIS — Z76.0 ENCOUNTER FOR MEDICATION REFILL: ICD-10-CM

## 2024-11-06 DIAGNOSIS — G57.93 NEUROPATHY OF BOTH FEET: ICD-10-CM

## 2024-11-06 DIAGNOSIS — I10 PRIMARY HYPERTENSION: ICD-10-CM

## 2024-11-06 RX ORDER — DESVENLAFAXINE 50 MG/1
50 TABLET, FILM COATED, EXTENDED RELEASE ORAL DAILY
Qty: 90 TABLET | Refills: 3 | Status: SHIPPED | OUTPATIENT
Start: 2024-11-06

## 2024-11-06 RX ORDER — AMLODIPINE AND BENAZEPRIL HYDROCHLORIDE 5; 10 MG/1; MG/1
1 CAPSULE ORAL DAILY
Qty: 90 CAPSULE | Refills: 3 | Status: SHIPPED | OUTPATIENT
Start: 2024-11-06

## 2025-02-20 ENCOUNTER — OFFICE VISIT (OUTPATIENT)
Dept: FAMILY MEDICINE | Facility: CLINIC | Age: 55
End: 2025-02-20
Payer: MEDICAID

## 2025-02-20 VITALS
TEMPERATURE: 98 F | RESPIRATION RATE: 18 BRPM | DIASTOLIC BLOOD PRESSURE: 82 MMHG | SYSTOLIC BLOOD PRESSURE: 138 MMHG | HEIGHT: 70 IN | BODY MASS INDEX: 34.79 KG/M2 | HEART RATE: 85 BPM | OXYGEN SATURATION: 96 % | WEIGHT: 243 LBS

## 2025-02-20 DIAGNOSIS — I10 PRIMARY HYPERTENSION: Primary | ICD-10-CM

## 2025-02-20 DIAGNOSIS — R06.83 SNORES: ICD-10-CM

## 2025-02-20 LAB
ANION GAP SERPL CALC-SCNC: 6 MEQ/L
BUN SERPL-MCNC: 18.1 MG/DL (ref 8.4–25.7)
CALCIUM SERPL-MCNC: 9.4 MG/DL (ref 8.4–10.2)
CHLORIDE SERPL-SCNC: 102 MMOL/L (ref 98–107)
CO2 SERPL-SCNC: 30 MMOL/L (ref 22–29)
CREAT SERPL-MCNC: 1.09 MG/DL (ref 0.72–1.25)
CREAT/UREA NIT SERPL: 17
GFR SERPLBLD CREATININE-BSD FMLA CKD-EPI: >60 ML/MIN/1.73/M2
GLUCOSE SERPL-MCNC: 180 MG/DL (ref 74–100)
POTASSIUM SERPL-SCNC: 4.2 MMOL/L (ref 3.5–5.1)
SODIUM SERPL-SCNC: 138 MMOL/L (ref 136–145)

## 2025-02-20 PROCEDURE — 80048 BASIC METABOLIC PNL TOTAL CA: CPT | Performed by: NURSE PRACTITIONER

## 2025-02-20 PROCEDURE — 36415 COLL VENOUS BLD VENIPUNCTURE: CPT | Performed by: NURSE PRACTITIONER

## 2025-02-20 PROCEDURE — 99215 OFFICE O/P EST HI 40 MIN: CPT | Mod: PBBFAC | Performed by: NURSE PRACTITIONER

## 2025-02-20 NOTE — ASSESSMENT & PLAN NOTE
Initial blood pressure 146/81.  Patient state he just took his blood pressure an hour before coming to clinic.  Patient currently compliance with amlodipine 5 mg and benazepril 10 mg p.o. daily.  Patient also admits to eating a shrimp put boy and fries prior to coming to the clinic for lunch.  Discussed with patient is very important follow with low-salt diet less than 2 g per day if possible.  Take medication consistently every day same time if possible.  Discussed weight loss habits, discussed exercising and strength training.  Discussed with patient to utilize my fitness pal and other apps that calculate calorie intakes and help achieve realistic weight loss.  Discussed with patient to join [facility] [employer] weight loss program.   Discussed with patient to lose 5-10% of his body weight in the next 3-6 months as a realistic approach.  20 min time spent to discuss weight management.  Manual Blood pressure prior to discharge 138/82.  Questions solicited and answered patient verbalized understanding.

## 2025-02-20 NOTE — ASSESSMENT & PLAN NOTE
Patient has missed his appointment since he was referred to sleep apnea study that was on October 27, 2023.  Patient requesting another referral.  [place] Sleepiness scale score is three.  No change from previous evaluation.  Phone number provided to clinic and also another referral has been initiated.  Advise patient to make every effort to attend.  Questions solicited and answered, patient verbalized understanding and agreed to plan of care.

## 2025-02-20 NOTE — PROGRESS NOTES
Patient Name: Humberto Cotto Jr.   : 1970  MRN: [id number]     SUBJECTIVE DATA:    CHIEF COMPLAINT:   Humberto Cotto Jr. is a 54 y.o. male who presents to clinic today with Follow-up        HPI:  54-year-old male presents to the clinic for routine follow-up.  Past medical history neuropathy, hypertension, snores, rectus diastasis.    Hypertension:  Initial blood pressure 146/81.  Patient state he just took his blood pressure an hour before coming to clinic.  Patient currently compliance with amlodipine 5 mg and benazepril 10 mg p.o. daily.  Patient also admits to eating a shrimp put boy and fries prior to coming to the clinic for lunch.  Discussed with patient is very important follow with low-salt diet less than 2 g per day if possible.  Take medication consistently every day same time if possible.  Discussed weight loss habits, discussed exercising and strength training.  Discussed with patient to utilize my fitness pal and other apps that calculate calorie intakes and help achieve realistic weight loss.  Discussed with patient to join [facility] weight loss program.   Discussed with patient to lose 5-10% of his body weight in the next 3-6 months as a realistic approach.  20 min time spent to discuss weight management.  Manual Blood pressure prior to discharge 138/82.  Questions solicited and answered patient verbalized understanding.    Snoring:  Patient has missed his appointment since he was referred to sleep apnea study that was on 2023.  Patient requesting another referral.  [place] Sleepiness scale score is three.  No change from previous evaluation.  Phone number provided to clinic and also another referral has been initiated.  Advise patient to make every effort to attend.  Questions solicited and answered, patient verbalized understanding and agreed to plan of care.      Patient denies chest pain, shortness of breath, dyspnea on exertion, palpitations, peripheral edema,  "abdominal pain, nausea, vomiting, diarrhea, constipation, fatigue, fever, chills, dysuria,  hematuria, dark stool or bloody stools.        ALLERGIES:   Review of patient's allergies indicates:   Allergen Reactions    Penicillin g sodium Hives         ROS:  Review of Systems   Constitutional:         Requesting referral to sleep study.   All other systems reviewed and are negative.        OBJECTIVE DATA:  Vital signs  Vitals:    02/20/25 1405 02/20/25 1505   BP: (!) 146/81 138/82   Pulse: 85    Resp: 18    Temp: 98.2 °F (36.8 °C)    TempSrc: Oral    SpO2: 96%    Weight: 110.2 kg (243 lb)    Height: 5' 10" (1.778 m)       Body mass index is 34.87 kg/m².    PHYSICAL EXAM:   Physical Exam  Vitals and nursing note reviewed.   Constitutional:       General: He is awake. He is not in acute distress.     Appearance: Normal appearance. He is well-developed and well-groomed. He is obese. He is not ill-appearing, toxic-appearing or diaphoretic.   HENT:      Head: Normocephalic and atraumatic.      Right Ear: Tympanic membrane, ear canal and external ear normal.      Left Ear: Tympanic membrane, ear canal and external ear normal.      Nose: Nose normal.      Mouth/Throat:      Lips: Pink.      Mouth: Mucous membranes are moist.      Tongue: Tongue does not deviate from midline.      Pharynx: Oropharynx is clear. Uvula midline.   Eyes:      General: Lids are normal. Gaze aligned appropriately.      Extraocular Movements: Extraocular movements intact.      Conjunctiva/sclera: Conjunctivae normal.      Pupils: Pupils are equal, round, and reactive to light.   Neck:      Trachea: Trachea and phonation normal.   Cardiovascular:      Rate and Rhythm: Normal rate and regular rhythm.      Pulses: Normal pulses.           Radial pulses are 2+ on the right side and 2+ on the left side.      Heart sounds: Normal heart sounds, S1 normal and S2 normal.   Pulmonary:      Effort: Pulmonary effort is normal.      Breath sounds: Normal breath " sounds and air entry.   Abdominal:      General: Bowel sounds are normal.      Palpations: Abdomen is soft.   Genitourinary:     Comments: Denies any urinary symptoms or bowel habit changes.  Musculoskeletal:         General: Normal range of motion.      Cervical back: Normal range of motion and neck supple.      Right lower leg: No edema.      Left lower leg: No edema.   Skin:     General: Skin is warm.      Capillary Refill: Capillary refill takes less than 2 seconds.   Neurological:      General: No focal deficit present.      Mental Status: He is alert and oriented to person, place, and time. Mental status is at baseline.      GCS: GCS eye subscore is 4. GCS verbal subscore is 5. GCS motor subscore is 6.      Cranial Nerves: No cranial nerve deficit.      Sensory: No sensory deficit.      Motor: No weakness.      Coordination: Coordination is intact.      Gait: Gait is intact.   Psychiatric:         Attention and Perception: Attention and perception normal.         Mood and Affect: Mood and affect normal.         Speech: Speech normal.         Behavior: Behavior normal. Behavior is cooperative.         Thought Content: Thought content normal.         Cognition and Memory: Cognition and memory normal.         Judgment: Judgment normal.          ASSESSMENT/PLAN:  1. Primary hypertension  Assessment & Plan:  Initial blood pressure 146/81.  Patient state he just took his blood pressure an hour before coming to clinic.  Patient currently compliance with amlodipine 5 mg and benazepril 10 mg p.o. daily.  Patient also admits to eating a shrimp put boy and fries prior to coming to the clinic for lunch.  Discussed with patient is very important follow with low-salt diet less than 2 g per day if possible.  Take medication consistently every day same time if possible.  Discussed weight loss habits, discussed exercising and strength training.  Discussed with patient to utilize my fitness pal and other apps that calculate  calorie intakes and help achieve realistic weight loss.  Discussed with patient to join [facility] weight loss program.   Discussed with patient to lose 5-10% of his body weight in the next 3-6 months as a realistic approach.  20 min time spent to discuss weight management.  Manual Blood pressure prior to discharge 138/82.  Questions solicited and answered patient verbalized understanding.    Orders:  -     Basic Metabolic Panel    2. Snores  Assessment & Plan:  Patient has missed his appointment since he was referred to sleep apnea study that was on October 27, 2023.  Patient requesting another referral.  [place] Sleepiness scale score is three.  No change from previous evaluation.  Phone number provided to clinic and also another referral has been initiated.  Advise patient to make every effort to attend.  Questions solicited and answered, patient verbalized understanding and agreed to plan of care.    Orders:  -     Ambulatory referral/consult to Sleep Disorders; Future; Expected date: 02/27/2025           RESULTS:  Recent Results (from the past 6 weeks)   Basic Metabolic Panel    Collection Time: 02/20/25  3:11 PM   Result Value Ref Range    Sodium 138 136 - 145 mmol/L    Potassium 4.2 3.5 - 5.1 mmol/L    Chloride 102 98 - 107 mmol/L    CO2 30 (H) 22 - 29 mmol/L    Glucose 180 (H) 74 - 100 mg/dL    Blood Urea Nitrogen 18.1 8.4 - 25.7 mg/dL    Creatinine 1.09 0.72 - 1.25 mg/dL    BUN/Creatinine Ratio 17     Calcium 9.4 8.4 - 10.2 mg/dL    Anion Gap 6.0 mEq/L    eGFR >60 mL/min/1.73/m2         Follow Up:  Follow up in about 6 months (around 8/20/2025).     Face to face time 35 minutes, including documentation, chart review, counseling, education, review of test results, relevant medical records, and coordination of care.   I have explained the treatment plan, diagnosis, and prognosis to patient. All questions are answered to the best of my knowledge.     Previous medical history/lab work/radiology  reviewed and considered during medical management decisions.   Medication list reviewed and medication reconciliation performed.  Patient was provided  and care about his/her current diagnosis (es) and medications including risk/benefit and side effects/adverse events, over the counter medication uses/doses, home self-care and contact precautions,  and red flags and indications for when to seek immediate medical attention.   Patient was advised to continue compliance with current medication list and medical recommendations.  Patient dvised continued compliance with recommended eating habits/ diets for medical conditions and exercise 150 minutes/ week (if possible) for medical condition (s).  Educational handouts and instructions on selected disease management in AVS (After Visit Summary).    All of the patient's questions were answered to patient's satisfaction.   The patient was receptive, expressed verbal understanding and agreement the above plan.      This note was created with the assistance of a voice recognition software or phone dictation. There may be transcription errors as a result of using this technology however minimal. Effort has been made to assure accuracy of transcription but any obvious errors or omissions should be clarified with the author of the document

## 2025-02-21 ENCOUNTER — RESULTS FOLLOW-UP (OUTPATIENT)
Dept: FAMILY MEDICINE | Facility: CLINIC | Age: 55
End: 2025-02-21
Payer: MEDICAID

## 2025-02-21 NOTE — PROGRESS NOTES
PLEASE CALL PATIENTS WITH RESULTS,   Please notify patient electrolytes, kidney functions within normal range.  Advise patient sugar is elevated at 180.  Should not be elevated after eating a meal that high.  Manage dietary habits exercise and lose weight.  Take care and have a great weekend.

## 2025-02-27 NOTE — TELEPHONE ENCOUNTER
----- Message from JOE Parson sent at 2/21/2025  3:34 PM CST -----  PLEASE CALL PATIENTS WITH RESULTS,   Please notify patient electrolytes, kidney functions within normal range.  Advise patient sugar is elevated at 180.  Should not be elevated after eating a meal that high.  Manage dietary habits exercise and lose weight.  Take care and have a great weekend.  ----- Message -----  From: Lab, Background User  Sent: 2/20/2025   4:31 PM CST  To: JOE Butler

## 2025-02-28 ENCOUNTER — TELEPHONE (OUTPATIENT)
Dept: INTERNAL MEDICINE | Facility: CLINIC | Age: 55
End: 2025-02-28
Payer: MEDICAID

## 2025-02-28 ENCOUNTER — TELEPHONE (OUTPATIENT)
Dept: FAMILY MEDICINE | Facility: CLINIC | Age: 55
End: 2025-02-28
Payer: MEDICAID

## 2025-02-28 DIAGNOSIS — R06.83 SNORING: Primary | ICD-10-CM

## 2025-02-28 NOTE — TELEPHONE ENCOUNTER
----- Message from Jacque sent at 2/28/2025 10:24 AM CST -----  Thank you, it was written, just have to change consult to Procedure-Under service for Polysomnogram  ----- Message -----  From: Corrine Haskins FNP  Sent: 2/28/2025  10:01 AM CST  To: Jacque Soto    Hello,I'm covering for Amber, I do not see order for sleep study. You can send it directly to me.Thank you,AKASH Young  ----- Message -----  From: Jacque Soto  Sent: 2/27/2025   2:02 PM CST  To: JOE Butler    Good Afternoon, can you please sign off on order for Sleep study? Thank you.

## 2025-03-06 ENCOUNTER — PROCEDURE VISIT (OUTPATIENT)
Dept: SLEEP MEDICINE | Facility: HOSPITAL | Age: 55
End: 2025-03-06
Attending: NURSE PRACTITIONER
Payer: MEDICAID

## 2025-03-06 DIAGNOSIS — R06.83 SNORES: ICD-10-CM

## 2025-03-06 DIAGNOSIS — G47.33 OSA (OBSTRUCTIVE SLEEP APNEA): Primary | ICD-10-CM

## 2025-03-06 DIAGNOSIS — R06.83 SNORING: ICD-10-CM

## 2025-03-06 PROCEDURE — 95810 POLYSOM 6/> YRS 4/> PARAM: CPT

## 2025-03-13 DIAGNOSIS — G47.33 OSA (OBSTRUCTIVE SLEEP APNEA): Primary | ICD-10-CM

## 2025-08-18 ENCOUNTER — OFFICE VISIT (OUTPATIENT)
Dept: INTERNAL MEDICINE | Facility: CLINIC | Age: 55
End: 2025-08-18
Payer: MEDICAID

## 2025-08-18 VITALS
RESPIRATION RATE: 18 BRPM | TEMPERATURE: 98 F | HEART RATE: 77 BPM | WEIGHT: 240 LBS | DIASTOLIC BLOOD PRESSURE: 82 MMHG | HEIGHT: 70 IN | BODY MASS INDEX: 34.36 KG/M2 | SYSTOLIC BLOOD PRESSURE: 129 MMHG

## 2025-08-18 DIAGNOSIS — R06.83 SNORING: ICD-10-CM

## 2025-08-18 DIAGNOSIS — Z13.6 SCREENING FOR HYPERTENSION: Primary | ICD-10-CM

## 2025-08-18 DIAGNOSIS — I10 PRIMARY HYPERTENSION: ICD-10-CM

## 2025-08-18 DIAGNOSIS — Z00.00 WELL ADULT EXAM: ICD-10-CM

## 2025-08-18 DIAGNOSIS — G62.9 NEUROPATHY: ICD-10-CM

## 2025-08-18 DIAGNOSIS — G47.10 HYPERSOMNIA: ICD-10-CM

## 2025-08-18 DIAGNOSIS — Z82.49 FAMILY HISTORY OF CARDIOVASCULAR DISEASE: ICD-10-CM

## 2025-08-18 DIAGNOSIS — Z76.0 ENCOUNTER FOR MEDICATION REFILL: ICD-10-CM

## 2025-08-18 DIAGNOSIS — G57.93 NEUROPATHY OF BOTH FEET: ICD-10-CM

## 2025-08-18 PROCEDURE — 3008F BODY MASS INDEX DOCD: CPT | Mod: CPTII,,, | Performed by: NURSE PRACTITIONER

## 2025-08-18 PROCEDURE — 99214 OFFICE O/P EST MOD 30 MIN: CPT | Mod: S$PBB,,, | Performed by: NURSE PRACTITIONER

## 2025-08-18 PROCEDURE — 1160F RVW MEDS BY RX/DR IN RCRD: CPT | Mod: CPTII,,, | Performed by: NURSE PRACTITIONER

## 2025-08-18 PROCEDURE — 99214 OFFICE O/P EST MOD 30 MIN: CPT | Mod: PBBFAC | Performed by: NURSE PRACTITIONER

## 2025-08-18 PROCEDURE — 3074F SYST BP LT 130 MM HG: CPT | Mod: CPTII,,, | Performed by: NURSE PRACTITIONER

## 2025-08-18 PROCEDURE — 1159F MED LIST DOCD IN RCRD: CPT | Mod: CPTII,,, | Performed by: NURSE PRACTITIONER

## 2025-08-18 PROCEDURE — 3079F DIAST BP 80-89 MM HG: CPT | Mod: CPTII,,, | Performed by: NURSE PRACTITIONER

## 2025-08-18 PROCEDURE — 4010F ACE/ARB THERAPY RXD/TAKEN: CPT | Mod: CPTII,,, | Performed by: NURSE PRACTITIONER

## 2025-08-18 RX ORDER — AMLODIPINE AND BENAZEPRIL HYDROCHLORIDE 5; 10 MG/1; MG/1
1 CAPSULE ORAL DAILY
Qty: 90 CAPSULE | Refills: 1 | Status: SHIPPED | OUTPATIENT
Start: 2025-08-18

## 2025-08-18 RX ORDER — DESVENLAFAXINE 50 MG/1
50 TABLET, FILM COATED, EXTENDED RELEASE ORAL DAILY
Qty: 90 TABLET | Refills: 1 | Status: SHIPPED | OUTPATIENT
Start: 2025-08-18

## 2025-08-25 PROBLEM — Z00.00 WELL ADULT EXAM: Status: RESOLVED | Noted: 2025-08-18 | Resolved: 2025-08-25

## 2025-08-25 PROBLEM — Z13.6 SCREENING FOR HYPERTENSION: Status: RESOLVED | Noted: 2025-08-18 | Resolved: 2025-08-25

## 2025-08-28 ENCOUNTER — HOSPITAL ENCOUNTER (OUTPATIENT)
Dept: CARDIOLOGY | Facility: HOSPITAL | Age: 55
Discharge: HOME | End: 2025-08-28
Attending: NURSE PRACTITIONER
Payer: MEDICAID

## 2025-08-28 DIAGNOSIS — Z82.49 FAMILY HISTORY OF CARDIOVASCULAR DISEASE: ICD-10-CM

## 2025-09-03 ENCOUNTER — HOSPITAL ENCOUNTER (OUTPATIENT)
Dept: CARDIOLOGY | Facility: HOSPITAL | Age: 55
Discharge: HOME | End: 2025-09-03
Attending: NURSE PRACTITIONER
Payer: MEDICAID

## 2025-09-03 VITALS — HEART RATE: 82 BPM | DIASTOLIC BLOOD PRESSURE: 85 MMHG | RESPIRATION RATE: 20 BRPM | SYSTOLIC BLOOD PRESSURE: 142 MMHG

## 2025-09-03 LAB
OHS QRS DURATION: 102 MS
OHS QRS DURATION: 98 MS
OHS QTC CALCULATION: 436 MS
OHS QTC CALCULATION: 451 MS

## 2025-09-03 PROCEDURE — 93017 CV STRESS TEST TRACING ONLY: CPT

## (undated) DEVICE — GAUZE SPONGE 4X4 12PLY

## (undated) DEVICE — SOL 9P NACL IRR PIC IL

## (undated) DEVICE — HANDLE DEVON RIGID OR LIGHT

## (undated) DEVICE — SUT 3-0 VICRYL / SH (J416)

## (undated) DEVICE — GOWN POLY REINF BRTH SLV XL

## (undated) DEVICE — MANIFOLD 4 PORT

## (undated) DEVICE — GLOVE PROTEXIS BLUE LATEX 7.5

## (undated) DEVICE — SYR 10CC LUER LOCK

## (undated) DEVICE — GLOVE PROTEXIS BLUE LATEX 8

## (undated) DEVICE — Device

## (undated) DEVICE — GOWN POLY REINF X-LONG 2XL

## (undated) DEVICE — NDL HYPO REG 25G X 1 1/2

## (undated) DEVICE — ADHESIVE MASTISOL VIAL 48/BX

## (undated) DEVICE — NDL SAFETY 21G X 1 1/2 ECLPSE

## (undated) DEVICE — APPLICATOR CHLORAPREP ORN 26ML

## (undated) DEVICE — SUT ETHIBOND EXCEL 0 MO6 18

## (undated) DEVICE — GLOVE PROTEXIS LTX MICRO  7.5

## (undated) DEVICE — ADHESIVE DERMABOND ADVANCED

## (undated) DEVICE — GLOVE PROTEXIS HYDROGEL SZ8

## (undated) DEVICE — GLOVE PROTEXIS BLUE LATEX 7

## (undated) DEVICE — GLOVE PROTEXIS LTX MICRO 8

## (undated) DEVICE — SUT PDSII 4-0 PS-2 CLEAR MO

## (undated) DEVICE — DRSNG POLYSKIN TRNSPAR 4X4.75